# Patient Record
Sex: FEMALE | Race: WHITE | Employment: OTHER | URBAN - METROPOLITAN AREA
[De-identification: names, ages, dates, MRNs, and addresses within clinical notes are randomized per-mention and may not be internally consistent; named-entity substitution may affect disease eponyms.]

---

## 2016-07-13 LAB
CHLAMYDIA, EXTERNAL: NEGATIVE
HBSAG, EXTERNAL: NEGATIVE
HIV, EXTERNAL: NON REACTIVE
N. GONORRHEA, EXTERNAL: NEGATIVE
RUBELLA, EXTERNAL: NORMAL

## 2016-11-29 LAB — T. PALLIDUM, EXTERNAL: NEGATIVE

## 2017-01-26 LAB
GRBS, EXTERNAL: NEGATIVE
TYPE, ABO & RH, EXTERNAL: NORMAL

## 2017-02-20 ENCOUNTER — HOSPITAL ENCOUNTER (INPATIENT)
Age: 27
LOS: 2 days | Discharge: HOME | End: 2017-02-22
Attending: OBSTETRICS & GYNECOLOGY | Admitting: OBSTETRICS & GYNECOLOGY
Payer: COMMERCIAL

## 2017-02-20 PROBLEM — Z37.9 NORMAL LABOR: Status: ACTIVE | Noted: 2017-02-20

## 2017-02-20 LAB
BASOPHILS # BLD AUTO: 0 K/UL (ref 0–0.1)
BASOPHILS # BLD: 0 % (ref 0–1)
EOSINOPHIL # BLD: 0.1 K/UL (ref 0–0.4)
EOSINOPHIL NFR BLD: 1 % (ref 0–7)
ERYTHROCYTE [DISTWIDTH] IN BLOOD BY AUTOMATED COUNT: 14.7 % (ref 11.5–14.5)
HCT VFR BLD AUTO: 37.7 % (ref 35–47)
HGB BLD-MCNC: 12.5 G/DL (ref 11.5–16)
LYMPHOCYTES # BLD AUTO: 14 % (ref 12–49)
LYMPHOCYTES # BLD: 1.7 K/UL (ref 0.8–3.5)
MCH RBC QN AUTO: 29.9 PG (ref 26–34)
MCHC RBC AUTO-ENTMCNC: 33.2 G/DL (ref 30–36.5)
MCV RBC AUTO: 90.2 FL (ref 80–99)
MONOCYTES # BLD: 0.8 K/UL (ref 0–1)
MONOCYTES NFR BLD AUTO: 6 % (ref 5–13)
NEUTS SEG # BLD: 9.9 K/UL (ref 1.8–8)
NEUTS SEG NFR BLD AUTO: 79 % (ref 32–75)
PLATELET # BLD AUTO: 233 K/UL (ref 150–400)
RBC # BLD AUTO: 4.18 M/UL (ref 3.8–5.2)
WBC # BLD AUTO: 12.5 K/UL (ref 3.6–11)

## 2017-02-20 PROCEDURE — 74011250636 HC RX REV CODE- 250/636: Performed by: OBSTETRICS & GYNECOLOGY

## 2017-02-20 PROCEDURE — 77030031139 HC SUT VCRL2 J&J -A

## 2017-02-20 PROCEDURE — 74011250637 HC RX REV CODE- 250/637: Performed by: OBSTETRICS & GYNECOLOGY

## 2017-02-20 PROCEDURE — 0KQM0ZZ REPAIR PERINEUM MUSCLE, OPEN APPROACH: ICD-10-PCS | Performed by: OBSTETRICS & GYNECOLOGY

## 2017-02-20 PROCEDURE — 36415 COLL VENOUS BLD VENIPUNCTURE: CPT | Performed by: OBSTETRICS & GYNECOLOGY

## 2017-02-20 PROCEDURE — 65410000002 HC RM PRIVATE OB

## 2017-02-20 PROCEDURE — 75410000000 HC DELIVERY VAGINAL/SINGLE

## 2017-02-20 PROCEDURE — 75410000003 HC RECOV DEL/VAG/CSECN EA 0.5 HR

## 2017-02-20 PROCEDURE — 75410000002 HC LABOR FEE PER 1 HR

## 2017-02-20 PROCEDURE — 85025 COMPLETE CBC W/AUTO DIFF WBC: CPT | Performed by: OBSTETRICS & GYNECOLOGY

## 2017-02-20 PROCEDURE — 77030021125

## 2017-02-20 PROCEDURE — 74011250636 HC RX REV CODE- 250/636

## 2017-02-20 RX ORDER — KETOROLAC TROMETHAMINE 30 MG/ML
INJECTION, SOLUTION INTRAMUSCULAR; INTRAVENOUS
Status: COMPLETED
Start: 2017-02-20 | End: 2017-02-20

## 2017-02-20 RX ORDER — NALOXONE HYDROCHLORIDE 0.4 MG/ML
0.4 INJECTION, SOLUTION INTRAMUSCULAR; INTRAVENOUS; SUBCUTANEOUS AS NEEDED
Status: DISCONTINUED | OUTPATIENT
Start: 2017-02-20 | End: 2017-02-22 | Stop reason: HOSPADM

## 2017-02-20 RX ORDER — BISACODYL 5 MG
5 TABLET, DELAYED RELEASE (ENTERIC COATED) ORAL DAILY PRN
Status: DISCONTINUED | OUTPATIENT
Start: 2017-02-20 | End: 2017-02-22 | Stop reason: HOSPADM

## 2017-02-20 RX ORDER — ONDANSETRON 4 MG/1
4 TABLET, ORALLY DISINTEGRATING ORAL
Status: DISCONTINUED | OUTPATIENT
Start: 2017-02-20 | End: 2017-02-22 | Stop reason: HOSPADM

## 2017-02-20 RX ORDER — IBUPROFEN 400 MG/1
800 TABLET ORAL EVERY 8 HOURS
Status: DISCONTINUED | OUTPATIENT
Start: 2017-02-21 | End: 2017-02-22 | Stop reason: HOSPADM

## 2017-02-20 RX ORDER — ONDANSETRON 2 MG/ML
4 INJECTION INTRAMUSCULAR; INTRAVENOUS
Status: DISCONTINUED | OUTPATIENT
Start: 2017-02-20 | End: 2017-02-20 | Stop reason: HOSPADM

## 2017-02-20 RX ORDER — OXYCODONE AND ACETAMINOPHEN 5; 325 MG/1; MG/1
1 TABLET ORAL
Status: DISCONTINUED | OUTPATIENT
Start: 2017-02-20 | End: 2017-02-22 | Stop reason: HOSPADM

## 2017-02-20 RX ORDER — SODIUM CHLORIDE 0.9 % (FLUSH) 0.9 %
5-10 SYRINGE (ML) INJECTION AS NEEDED
Status: DISCONTINUED | OUTPATIENT
Start: 2017-02-20 | End: 2017-02-22 | Stop reason: HOSPADM

## 2017-02-20 RX ORDER — DIPHENHYDRAMINE HCL 25 MG
25 CAPSULE ORAL
Status: DISCONTINUED | OUTPATIENT
Start: 2017-02-20 | End: 2017-02-22 | Stop reason: HOSPADM

## 2017-02-20 RX ORDER — OXYTOCIN/RINGER'S LACTATE 20/1000 ML
125-500 PLASTIC BAG, INJECTION (ML) INTRAVENOUS ONCE
Status: COMPLETED | OUTPATIENT
Start: 2017-02-20 | End: 2017-02-20

## 2017-02-20 RX ORDER — SODIUM CHLORIDE 0.9 % (FLUSH) 0.9 %
5-10 SYRINGE (ML) INJECTION EVERY 8 HOURS
Status: DISCONTINUED | OUTPATIENT
Start: 2017-02-20 | End: 2017-02-22 | Stop reason: HOSPADM

## 2017-02-20 RX ORDER — KETOROLAC TROMETHAMINE 30 MG/ML
30 INJECTION, SOLUTION INTRAMUSCULAR; INTRAVENOUS
Status: COMPLETED | OUTPATIENT
Start: 2017-02-20 | End: 2017-02-20

## 2017-02-20 RX ORDER — CALCIUM CARBONATE 200(500)MG
400 TABLET,CHEWABLE ORAL
Status: DISCONTINUED | OUTPATIENT
Start: 2017-02-20 | End: 2017-02-20 | Stop reason: HOSPADM

## 2017-02-20 RX ORDER — SIMETHICONE 80 MG
80 TABLET,CHEWABLE ORAL
Status: DISCONTINUED | OUTPATIENT
Start: 2017-02-20 | End: 2017-02-22 | Stop reason: HOSPADM

## 2017-02-20 RX ORDER — SODIUM CHLORIDE, SODIUM LACTATE, POTASSIUM CHLORIDE, CALCIUM CHLORIDE 600; 310; 30; 20 MG/100ML; MG/100ML; MG/100ML; MG/100ML
125 INJECTION, SOLUTION INTRAVENOUS CONTINUOUS
Status: DISCONTINUED | OUTPATIENT
Start: 2017-02-20 | End: 2017-02-22 | Stop reason: HOSPADM

## 2017-02-20 RX ORDER — IBUPROFEN 400 MG/1
800 TABLET ORAL EVERY 8 HOURS
Status: DISCONTINUED | OUTPATIENT
Start: 2017-02-20 | End: 2017-02-20

## 2017-02-20 RX ORDER — HYDROCORTISONE ACETATE PRAMOXINE HCL 2.5; 1 G/100G; G/100G
CREAM TOPICAL AS NEEDED
Status: DISCONTINUED | OUTPATIENT
Start: 2017-02-20 | End: 2017-02-22 | Stop reason: HOSPADM

## 2017-02-20 RX ORDER — ACETAMINOPHEN 325 MG/1
650 TABLET ORAL
Status: DISCONTINUED | OUTPATIENT
Start: 2017-02-20 | End: 2017-02-22 | Stop reason: HOSPADM

## 2017-02-20 RX ORDER — NALOXONE HYDROCHLORIDE 0.4 MG/ML
0.4 INJECTION, SOLUTION INTRAMUSCULAR; INTRAVENOUS; SUBCUTANEOUS AS NEEDED
Status: DISCONTINUED | OUTPATIENT
Start: 2017-02-20 | End: 2017-02-20 | Stop reason: HOSPADM

## 2017-02-20 RX ORDER — OXYTOCIN/RINGER'S LACTATE 20/1000 ML
PLASTIC BAG, INJECTION (ML) INTRAVENOUS
Status: COMPLETED
Start: 2017-02-20 | End: 2017-02-20

## 2017-02-20 RX ORDER — LIDOCAINE HYDROCHLORIDE 10 MG/ML
INJECTION, SOLUTION EPIDURAL; INFILTRATION; INTRACAUDAL; PERINEURAL
Status: DISPENSED
Start: 2017-02-20 | End: 2017-02-21

## 2017-02-20 RX ADMIN — IBUPROFEN 800 MG: 400 TABLET, FILM COATED ORAL at 20:12

## 2017-02-20 RX ADMIN — Medication 19980 MILLI-UNITS/HR: at 16:02

## 2017-02-20 RX ADMIN — SODIUM CHLORIDE, SODIUM LACTATE, POTASSIUM CHLORIDE, AND CALCIUM CHLORIDE 999 ML/HR: 600; 310; 30; 20 INJECTION, SOLUTION INTRAVENOUS at 13:45

## 2017-02-20 RX ADMIN — Medication 20000 MILLI-UNITS: at 13:57

## 2017-02-20 RX ADMIN — KETOROLAC TROMETHAMINE 30 MG: 30 INJECTION, SOLUTION INTRAMUSCULAR at 14:10

## 2017-02-20 RX ADMIN — SODIUM CHLORIDE, SODIUM LACTATE, POTASSIUM CHLORIDE, AND CALCIUM CHLORIDE 999 ML/HR: 600; 310; 30; 20 INJECTION, SOLUTION INTRAVENOUS at 12:45

## 2017-02-20 RX ADMIN — KETOROLAC TROMETHAMINE 30 MG: 30 INJECTION, SOLUTION INTRAMUSCULAR; INTRAVENOUS at 14:10

## 2017-02-20 NOTE — L&D DELIVERY NOTE
Delivery Summary  Patient: Malika Cherry             Circumcision:   desires  Additional Delivery Comments - Pt presented to L&D in active labor at 6+ cm. Reassuring fetal monitoring - category 1. SROM - meconium with immediate strong urge to push.  over an intact perineum. Rapid second stage of labor - merely 1-2 pushes. Infant delivered in the BARRY position and restituted to LOT. A loose nuchal cord was easily reduced. The shoulders delivered spontaneously without any evidence of shoulder dystocia. The rest of the ensuing body delivered with ease. The mouth was suctioned while the cord was allowed to continue pulsating. The infant was placed on the mother's abdomen and the cord was then clamped and cut. An intact placenta with a 3-vessel cord and meconium-stained membranes delivered spontaneously. The cervix, vagina and rectum were examined. A large second degree perineal laceration and a right periurethral laceration were identified. 1% lidocaine was injected locally. The external anal sphincter was noted to be intact and was reinforced with a figure-of-eight of 2-0 vicryl. The second degree perineal laceration was repaired in the usual fashion with 2-0 and 3-0 vicryl. The right periurethral laceration was re-approximated with 4-0 vicryl. Excellent hemostasis was achieved. The rectum was noted to be intact. Mother and baby were stable at the end of the delivery.      Male - [place] Radha, 9lb 2oz, Apgars 9/9      Information for the patient's :  Napolean Daily [[id number]]       Labor Events:    Labor: No   Rupture Date: 2017   Rupture Time: 1:46 PM   Rupture Type SROM   Amniotic Fluid Volume: Large    Amniotic Fluid Description: Meconium None   Induction: None       Augmentation: None   Labor Events: None     Cervical Ripening:     None     Delivery Events:  Episiotomy: None   Laceration(s): Second degree perineal;Right periurethral     Repaired: Yes    Number of Repair Packets: 3 Suture Type and Size: Vicryl 2-0  Vicryl 3-0  Other Viryl 4-0   Estimated Blood Loss (ml): 450ml       Delivery Date: 2017    Delivery Time: 1:51 PM  Delivery Type: Vaginal, Spontaneous Delivery  Sex:  Male     Gestational Age: 44w3d   Delivery Clinician:  Madison Simpson  Living Status: Yes   Delivery Location: L&D            APGARS  One minute Five minutes Ten minutes   Skin color: 1   1        Heart rate: 2   2        Grimace: 2   2        Muscle tone: 2   2        Breathin   2        Totals: 9   9            Presentation: Vertex    Position: Left Occiput Anterior  Resuscitation Method:  Suctioning-bulb; Tactile Stimulation     Meconium Stained: Thin      Cord Vessels: 3 Vessels      Cord Events:    Cord Blood Sent?:  No    Blood Gases Sent?:  No    Placenta:  Date/Time:   1:57 PM  Removal: Spontaneous      Appearance: Intact     [place] Measurements:  Birth Weight: 4.16 kg      Birth Length: 53.3 cm      Head Circumference: 36.8 cm      Chest Circumference: 35.6 cm     Abdominal Girth: 34.9 cm    Other Providers:   Brooke LOCKE;JEMMA SCOTT;SHAHID CALLE;MIRIAM RYAN;SARAH ROJAS;SOFIA CARRERO, Obstetrician;Primary Nurse;Primary [place] Nurse; Obstetrician;Staff Nurse;Tech           Cord pH:  none    Episiotomy: None   Laceration(s): Second degree perineal;Right periurethral      Estimated Blood Loss (ml): 450    Labor Events  Method: None       Augmentation: None   Cervical Ripening:       None        Operative Vaginal Delivery - none    Group B Strep:   Lab Results   Component Value Date/Time    GrBStrep, External negative 2017     Information for the patient's :  Teddy Dillon [[id number]]   No results found for: ABORH, PCTABR, PCTDIG, BILI, ABORHEXT, ABORH    No results found for: APH, APCO2, APO2, AHCO3, ABEC, ABDC, O2ST, EPHV, PCO2V, PO2V, HCO3V, EBEV, EBDV, SITE, RSCOM

## 2017-02-20 NOTE — LACTATION NOTE
Discussed with mother her plan for feeding. Reviewed the benefits of exclusive breast milk feeding during the hospital stay. Informed mother of the risks of using formula to supplement in the first few days of life as well as the benefits of successful breast milk feeding; referred mother to the handout in her admission packet related to these topics. Mother acknowledges understanding of information reviewed and states that it is her plan to breast milk feed exclusively  her infant. Will support her choice and offer additional information as needed.

## 2017-02-20 NOTE — LACTATION NOTE
This note was copied from a baby's chart.  at one hour of life. Infant is latched in cross cradle hold, repeated suckle/pause cycles noted. Experienced and confident mother, asked if latch looked all right. Assisted to flange upper lip to assure deeper contact. Mother is fair skin/red hair with stated sensitive skin. Managed through sore nipples with 1st without concern. She has just weaned her 2 yr old 11 weeks ago. Over supply and donated milk to adoptive parents through a private network. Reviewed daily I/0 expectations and initiated bedside log. Expect success. Call prn. 3 [facility] # provided.

## 2017-02-20 NOTE — PROGRESS NOTES
1230- pt arrived from office with c/o labor- sve done in office by dr. Sharon Berumen and pt was 6-7cm. , pt reports no complications with this pregnancy. Pt reports starting labor at about 0630 with contractions increasing in intensity and she  denies rupture of membranes. Pt to bathroom to gown. Breathing through contractions. Pt sts she may want an epidural but if her labor is fast she would prefer to go without. Consents reviewed and witnessed. 1235- dr. Davy Lazaro in. Strip reviewed and poc discussed    1300- dr. Davy Lazaro reviewed strip. May take pt off monitor to ambulate    1335- pt requesting to be checked- sve done by alexy, pt requesting arom    1337-dr. karu in. Strip reviewed. poc discussed and  pt now states wants epidural and to wait on arom    1346- srom- mec fluid noted, pt sts she has to push, dr. Davy Lazaro called to bedside    1349- dr. La [place] in.     1351- head delivered, nuchal reduced x1- loose, shoulders and body delivered without difficulty.  Dr. Davy Lazaro in at [phone]- sbar report to celia,jeffc

## 2017-02-20 NOTE — PROGRESS NOTES
1500 - Bedside and Verbal shift change report given to Darylene Echevaria, RN (oncoming nurse) by JACKSON Nava RN (offgoing nurse). Report included the following information SBAR, Procedure Summary, Intake/Output, MAR and Recent Results     1600 - Discussed heavier but normal bleeding with Dr. Lacie Starkey. Plan to increase pitocin to 999ml/hr to finish bag. Will continue to monitor. 1622 - TRANSFER - OUT REPORT:    Verbal report given to Ehsan Adams RN (name) on Chandan Mcneil  being transferred to MIU (unit) for routine progression of care       Report consisted of patients Situation, Background, Assessment and   Recommendations(SBAR). Information from the following report(s) SBAR, Procedure Summary, Intake/Output, MAR and Recent Results was reviewed with the receiving nurse. Lines:   Peripheral IV 02/20/17 Left Hand (Active)   Site Assessment Clean, dry, & intact 2/20/2017  3:14 PM   Phlebitis Assessment 0 2/20/2017  3:14 PM   Infiltration Assessment 0 2/20/2017  3:14 PM   Dressing Status Clean, dry, & intact 2/20/2017  3:14 PM   Dressing Type Tape;Transparent 2/20/2017  3:14 PM   Hub Color/Line Status Pink; Infusing 2/20/2017  3:14 PM   Action Taken Blood drawn 2/20/2017 12:54 PM        Opportunity for questions and clarification was provided.       Patient transported with:

## 2017-02-20 NOTE — IP AVS SNAPSHOT
Current Discharge Medication List  
  
Take these medications at their scheduled times Dose & Instructions Dispensing Information Comments Morning Noon Evening Bedtime  
 cholecalciferol 1,000 unit Cap Commonly known as:  VITAMIN D3 Your next dose is: Today, Tomorrow Other:  ____________ Take  by mouth daily. Refills:  0 FIBER SUPPLEMENT PO Your next dose is: Today, Tomorrow Other:  ____________ Take  by mouth two (2) times a day. Refills:  0  
     
   
   
   
  
 levothyroxine 137 mcg tablet Commonly known as:  SYNTHROID Your next dose is: Today, Tomorrow Other:  ____________ Dose:  137 mcg Take 137 mcg by mouth Daily (before breakfast). New higher dose replaces prior script on file Quantity:  90 Tab Refills:  3 Take these medications as needed Dose & Instructions Dispensing Information Comments Morning Noon Evening Bedtime  
 ibuprofen 600 mg tablet Commonly known as:  MOTRIN Your next dose is: Today, Tomorrow Other:  ____________ Dose:  600 mg Take 1 Tab by mouth every six (6) hours as needed for Pain for up to 360 days. Quantity:  30 Tab Refills:  0 Take these medications as directed Dose & Instructions Dispensing Information Comments Morning Noon Evening Bedtime PRENATAL PO Your next dose is: Today, Tomorrow Other:  ____________ Take  by mouth. Refills:  0 Where to Get Your Medications Information about where to get these medications is not yet available ! Ask your nurse or doctor about these medications  
  ibuprofen 600 mg tablet

## 2017-02-20 NOTE — H&P
EDC:02/17/2017  EGA: 40 weeks, 3 days      Vital Signs   32Years Old Female  Weight: 172.9 pounds  BP:       106/66    Pap/HPV/Gardasil History   History of abnormal pap: no  Gardasil Injection History: Complete              Allergies      Medications Removed from Medication List        Flowsheet View for Follow-up Visit     Estimated weeks of        gestation:  36 3/7     Weight:  172.9     Blood pressure: 106 / 66     Urine Protein:  N     Urine Glucose: N     Headache:  No     Nausea/vomiting: No     Edema:  0     Vaginal bleeding: no     Vaginal discharge: d/c     Fetal activity:  yes     FHR:   144     Labor symptoms: yes     Fetal position:  vertex     Cx Dilation:  6-7     Cx Effacement: 80%     Cx Station:  -1     Next visit:  1 wk     Preceptor:  kg     Comment:  Reports contractions every 2 minutes, lasting for 30 seconds, had some discharge/\"gunk\" with pink tinge. No VB or LOF. Active FM        Impression & Recommendations:    Problem # 1:  Labor term active (ICD-650) ([id number])  admit to L&D in active labor  cbc, STBB  Orders:  Antepartum Care (CPT-10)        Medications (at conclusion of this visit)    04/22/2016 BRAINSTRONG PRENATAL 33-0.8 & 350 MG ORAL MISC (PRENATAL MV-MIN-FE CBN-FA-DHA) Prescribed by non 606/706 Juanjo Mcwilliams MD  04/22/2016 LEVO-T 137 MCG ORAL TABS (LEVOTHYROXINE SODIUM)           Visit Type:  Prenatal  Primary Provider:  Virgie Chou MD    CC:  Uterine Contractions. History of Present Illness:  Reports contractions every 2 minutes, lasting for 30 seconds, had some discharge/\"gunk\" with pink tinge. No VB or LOF.  Active FM        Past Medical History:     Reviewed history from 07/14/2016 and no changes required:        Thyroid Cancer 18yo        CF neg 2014    Past Surgical History:     Reviewed history from 07/13/2016 and no changes required:        Thyroidectomy 18yo        Vaginal Delivery 2015    Family History Summary:      Reviewed history Last on 07/13/2016 and no changes required:02/20/2017      General Comments - FH:  Family history transferred to [address] [place] [address] And 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[employer], homemaker                  Vital Signs    Blood Pressure: 106 / 66    Weight:  172.9 pounds                    Dilation: : 6-7                  Effacement:  80%                  Station:  -1                  Presentation:  vertex    Allergies      Medications Removed from Medication List        Impression & Recommendations:    Problem # 1:  Labor term active (ICD-650) ([id number])  admit to L&D in active labor  cbc, STBB  Orders:  Antepartum Care (CPT-10)        Medications (at conclusion of this visit)    04/22/2016 BRAINSTRONG PRENATAL 33-0.8 & 350 MG ORAL MISC (PRENATAL MV-MIN-FE CBN-FA-DHA) Prescribed by non [facility] MD  04/22/2016 LEVO-T 137 MCG ORAL TABS (LEVOTHYROXINE SODIUM)           LABORATORY DATA   TEST DATE RESULT   Group B Strep culture 01/26/2017 Negative                                   (Group B Strep Culture Result Field)   Blood Type 07/13/2016 O                                             (Blood Type Result Field)   Rh 07/13/2016 Positive                                   (Rh Result Field)   Rhogam Inj Given     Tdap Vaccine Given 11/29/2016 Vacc.  [address]   Antibody Screen 11/29/2016 See Final Results   Rubella  [facility] Reference Ranges On or After 3/10/14                  <0.90              Non-immune      0.90 - 0.99     Equivocal      >0.99              Immune    [facility] Reference Ranges  Before 3/10/14           <5                 Non-immune             5 - 9               Equivocal            >9                 Immune  [facility] Reference Ranges       < Or = 0.90       Negative             0.91-1.09          Equivocal            > Or = 1.10       Positive   07/13/2016     9.48     TPA (T Pallidum Antibodies) 11/29/2016 Negative   Serology (RPR)     HBsAg 07/13/2016 Negative HIV 07/13/2016 Non Reactive   Hemoglobin 11/29/2016 10.2   Hematocrit 11/29/2016 30.9   Platelets 13/33/9305 204 X10E3/UL   TSH 11/29/2016 0.886   Urine Culture 08/10/2016 Negative   GC DNA Probe 07/13/2016 Negative   Chlamydia DNA 07/13/2016 Negative   PAP 04/22/2016 NIL   Flu Vaccine Given 09/08/2016 Vacc. [facility]   HGBA1C     HGB Electro     T4, Free 11/29/2016 1.24   BG Fasting     GTT 1H 50G 11/29/2016 86   GTT 1H 100G     GTT 2H 100G     GTT 3H 100G     Glucose Plasma     CF Accept or Decline 07/13/2016 Prev Neg   CF Screen Result     Nuchal Trans 07/13/2016 Declined   AFP Only     Tetra 07/13/2016 Declined   AFP Serum     CVS 07/13/2016 declined   AFP Amniotic     Amnio Karyo     FISH     GC Culture     Chlamydia Cult     Ureaplasma     Mycoplasma     WBC 07/13/2016 8.2 X10E3/UL   RBC 07/13/2016 4.08 X10E6/UL   MCV 07/13/2016 92   MCH 07/13/2016 30.1   MCHC RBC 07/13/2016 32.6     ULTRASOUND DATA   TEST DATE RESULT   Estimated Fetal Weight 10/12/2016 [phone]                                     Weight % 10/12/2016 80th%%                                                JIMMY                      BPP     Cervical Length (mm)       ]      Electronically signed by Isatu Strauss MD on 02/20/2017 at 12:04 PM    ________________________________________________________________________    Date of Surgery Update:  Yoko Del Angel was seen and examined. Ctx began around 0100 and became regular at 0630. No LOF, some bloody show. Good fetal movement. Pregnancy complicated by anemia (on iron) and h/o thyroid CA (thyroidectomy, now on synthroid). History and physical has been reviewed. The patient has been examined.  There have been no significant clinical changes since the completion of the originally dated History and Physical.    Signed By: Lalito Pizano MD     February 20, 2017 12:42 PM

## 2017-02-20 NOTE — IP AVS SNAPSHOT
[address] [facility] 
[phone] Patient: Silke Brareto MRN: [id number] EDJ:[birth year] You are allergic to the following No active allergies Recent Documentation Height  
  
  
  
  
  
 1.626 m Unresulted Labs Order Current Status SAMPLE TO BLOOD BANK In process Emergency Contacts Name Discharge Info Relation Home Work Mobile Willy Davison DISCHARGE CAREGIVER [3] Spouse [3] [phone] About your hospitalization You were admitted on:  February 20, 2017 You last received care in the:  [facility] You were discharged on:  February 22, 2017 Unit phone number:  [phone] Why you were hospitalized Your primary diagnosis was:  Not on File Your diagnoses also included:  Normal Labor Providers Seen During Your Hospitalizations Provider Role Specialty Primary office phone Wilfredo Lowry MD Attending Provider Obstetrics & Gynecology [phone] Kerry Richardson MD Attending Provider Obstetrics & Gynecology [phone] Your Primary Care Physician (PCP) Primary Care Physician Office Phone Office Fax NONE ** None ** ** None ** Follow-up Information Follow up With Details Comments Contact Info None   None (395) Patient stated that they have no PCP Kerry Richardson MD In 6 weeks Follow up with Dr. Stan Mar in 6 weeks [address] [id number] [phone] Your Appointments Friday March 31, 2017  8:30 AM EDT Follow Up with [facility] [address] [address] [phone] Current Discharge Medication List  
  
START taking these medications Dose & Instructions Dispensing Information Comments Morning Noon Evening Bedtime  
 ibuprofen 600 mg tablet Commonly known as:  MOTRIN Your next dose is: Today, Tomorrow Other:  _________ Dose:  600 mg Take 1 Tab by mouth every six (6) hours as needed for Pain for up to 360 days. Quantity:  30 Tab Refills:  0 CONTINUE these medications which have NOT CHANGED Dose & Instructions Dispensing Information Comments Morning Noon Evening Bedtime  
 cholecalciferol 1,000 unit Cap Commonly known as:  VITAMIN D3 Your next dose is: Today, Tomorrow Other:  _________ Take  by mouth daily. Refills:  0 FIBER SUPPLEMENT PO Your next dose is: Today, Tomorrow Other:  _________ Take  by mouth two (2) times a day. Refills:  0  
     
   
   
   
  
 levothyroxine 137 mcg tablet Commonly known as:  SYNTHROID Your next dose is: Today, Tomorrow Other:  _________ Dose:  137 mcg Take 137 mcg by mouth Daily (before breakfast). New higher dose replaces prior script on file Quantity:  90 Tab Refills:  3 PRENATAL PO Your next dose is: Today, Tomorrow Other:  _________ Take  by mouth. Refills:  0 Where to Get Your Medications Information on where to get these meds will be given to you by the nurse or doctor. ! Ask your nurse or doctor about these medications  
  ibuprofen 600 mg tablet Discharge Instructions POST DELIVERY DISCHARGE INSTRUCTIONS Name: Dillan Raza YOB: 1990 Primary Diagnosis: Active Problems: * No active hospital problems. * General:  
 
Diet/Diet Restrictions: 
· Eight 8-ounce glasses of fluid daily (water, juices); avoid excessive caffeine intake. · Meals/snacks as desired which are high in fiber and carbohydrates and low in fat and cholesterol. Medications:  
 
 
 
Physical Activity / Restrictions / Safety: · Avoid heavy lifting, no more that 8 lbs. For 2-3 weeks;  
· Limit use of stairs to 2 times daily for the first week home. · No driving for one week. · Avoid intercourse 4-6 weeks, no douching or tampon use. · Check with obstetrician before starting or resuming an exercise program.   
 
Discharge Instructions/Special Treatment/Home Care Needs:  
 
· Continue prenatal vitamins. · Continue to use squirt bottle with warm water on your episiotomy after each bathroom use until bleeding stops. · If steri-strips applied to your incision, remove in 7-10 days. Call your doctor for the following: · Fever over 101 degrees by mouth. · Vaginal bleeding heavier than a normal menstrual period or clots larger than a golf ball. · Red streaks or increased swelling of legs, painful red streaks on your breast. 
· Painful urination, constipation and increased pain or swelling or discharge with your incision. · If you feel extremely anxious or overwhelmed. · If you have thoughts of harming yourself and/or your baby. Pain Management:  
 
· Take Acetaminophen (Tylenol) or Ibuprofen (Advil, Motrin), as directed for pain. · Use a warm Sitz bath 3 times daily to relieve episiotomy or hemorrhoidal discomfort. · For hemorrhoidal discomfort, use Tucks and Anusol cream as needed and directed. · Heating pad to  incision as needed. Follow-Up Care:  
 
Appointment with MD: Follow-up Appointments Procedures  FOLLOW UP VISIT Appointment in: 6 Weeks With Dr. Marcos Ford for postpartum check With Dr. Marcos Ford for postpartum check Standing Status:   Standing Number of Occurrences:   1 Order Specific Question:   Appointment in Answer:   6 Weeks Telephone number: [phone] Signed By: Jagdeep Solitario MD                                                                                                   Date: 2/22/2017 Time: 8:46 AM 
 
Discharge Orders None [facility] Announcement We are excited to announce that we are making your provider's discharge notes available to you in ULURU. You will see these notes when they are completed and signed by the physician that discharged you from your recent hospital stay. If you have any questions or concerns about any information you see in ULURU, please call the Health Information Department where you were seen or reach out to your Primary Care Provider for more information about your plan of care. Introducing [facility]! Dear Justin Carrillo: 
Thank you for requesting a ULURU account. Our records indicate that you already have an active ULURU account. You can access your account anytime at [email or web address] Did you know that you can access your hospital and ER discharge instructions at any time in ULURU? You can also review all of your test results from your hospital stay or ER visit. Additional Information If you have questions, please visit the Frequently Asked Questions section of the ULURU website at [email or web address] Remember, ULURU is NOT to be used for urgent needs. For medical emergencies, dial 911. Now available from your iPhone and Android! General Information Please provide this summary of care documentation to your next provider. Patient Signature:  ____________________________________________________________ Date:  ____________________________________________________________  
  
Jeffrey Vega Provider Signature:  ____________________________________________________________ Date:  ____________________________________________________________

## 2017-02-21 LAB
ERYTHROCYTE [DISTWIDTH] IN BLOOD BY AUTOMATED COUNT: 14.6 % (ref 11.5–14.5)
HCT VFR BLD AUTO: 28.2 % (ref 35–47)
HGB BLD-MCNC: 9.5 G/DL (ref 11.5–16)
MCH RBC QN AUTO: 30.2 PG (ref 26–34)
MCHC RBC AUTO-ENTMCNC: 33.7 G/DL (ref 30–36.5)
MCV RBC AUTO: 89.5 FL (ref 80–99)
PLATELET # BLD AUTO: 176 K/UL (ref 150–400)
RBC # BLD AUTO: 3.15 M/UL (ref 3.8–5.2)
WBC # BLD AUTO: 10.1 K/UL (ref 3.6–11)

## 2017-02-21 PROCEDURE — 65410000002 HC RM PRIVATE OB

## 2017-02-21 PROCEDURE — 74011250637 HC RX REV CODE- 250/637: Performed by: OBSTETRICS & GYNECOLOGY

## 2017-02-21 PROCEDURE — 36415 COLL VENOUS BLD VENIPUNCTURE: CPT | Performed by: OBSTETRICS & GYNECOLOGY

## 2017-02-21 PROCEDURE — 85027 COMPLETE CBC AUTOMATED: CPT | Performed by: OBSTETRICS & GYNECOLOGY

## 2017-02-21 RX ADMIN — IBUPROFEN 800 MG: 400 TABLET, FILM COATED ORAL at 21:02

## 2017-02-21 RX ADMIN — LEVOTHYROXINE SODIUM 137 MCG: 112 TABLET ORAL at 04:05

## 2017-02-21 RX ADMIN — IBUPROFEN 800 MG: 400 TABLET, FILM COATED ORAL at 04:05

## 2017-02-21 RX ADMIN — IBUPROFEN 800 MG: 400 TABLET, FILM COATED ORAL at 11:38

## 2017-02-21 NOTE — ROUTINE PROCESS
Bedside shift change report given to ARMANDO Andres (oncoming nurse) by Javon Abrams (offgoing nurse). Report included the following information SBAR.

## 2017-02-21 NOTE — ROUTINE PROCESS
Bedside shift change report given to PAVAN Vasquez RN (oncoming nurse) by ARMANDO Naqvi RN (offgoing nurse). Report included the following information SBAR.

## 2017-02-21 NOTE — LACTATION NOTE
This note was copied from a baby's chart. Infant  11 times in 24 hours with 2 voids and 6 stools. Weight loss is 3.7%. LATCH scores are 9-9 and 9. Mom has lacation resource numbers for discharge. Mom states nursing is going very well, nipples are intact. Mom is resting between feds. Mom wanted feeding assessed. Infant on right side . Nipple was flattening at base. Mom got into deeper latch and nipple rounded. Left nipple is flatter and Mom is using shells per choice to jag, however that nipple is pink and tender. Recommmended to rest left nipple for next feed and just use shell for 10 minutes prior to feed to jag when she does put infant to left side. Also set up pump that she may use for eversion  in lieu of shells. Recommended shells with larger base for healing nipple.

## 2017-02-21 NOTE — PROGRESS NOTES
Post-Partum Day Number 1 Progress Note    Rahel Davison     Assessment: Doing well, post partum day 1    Plan:  1. Continue routine postpartum and perineal care as well as maternal education. 2. The risks and benefits of the circumcision  procedure and anesthesia including: bleeding, infection, variability of cosmetic results were discussed at length with the mother. She is aware that future repeat procedures may be necessary. She gives informed consent to proceed as noted and her questions are answered. Information for the patient's :  Kristie Alberto [[id number]]   Vaginal, Spontaneous Delivery   Patient doing well without significant complaint. Voiding without difficulty, normal lochia. Vitals:  Visit Vitals    BP 95/62 (BP 1 Location: Right arm)    Pulse 77    Temp 98.1 °F (36.7 °C)    Resp 16    Ht 5' 4\" (1.626 m)    Wt 78.5 kg (173 lb)    SpO2 98%    Breastfeeding Yes    BMI 29.7 kg/m2     Temp (24hrs), Av.3 °F (36.8 °C), Min:97.8 °F (36.6 °C), Max:98.7 °F (37.1 °C)        Exam:   Patient without distress. Abdomen soft, fundus firm, nontender                Perineum with normal lochia noted. Lower extremities are negative for swelling, cords or tenderness.     Labs:     Lab Results   Component Value Date/Time    WBC 10.1 2017 04:11 AM    WBC 12.5 2017 12:50 PM    HGB 9.5 2017 04:11 AM    HGB 12.5 2017 12:50 PM    HCT 28.2 2017 04:11 AM    HCT 37.7 2017 12:50 PM    PLATELET 458  04:11 AM    PLATELET 340  12:50 PM       Recent Results (from the past 24 hour(s))   CBC WITH AUTOMATED DIFF    Collection Time: 17 12:50 PM   Result Value Ref Range    WBC 12.5 (H) 3.6 - 11.0 K/uL    RBC 4.18 3.80 - 5.20 M/uL    HGB 12.5 11.5 - 16.0 g/dL    HCT 37.7 35.0 - 47.0 %    MCV 90.2 80.0 - 99.0 FL    MCH 29.9 26.0 - 34.0 PG    MCHC 33.2 30.0 - 36.5 g/dL    RDW 14.7 (H) 11.5 - 14.5 %    PLATELET 010 150 - 400 K/uL    NEUTROPHILS 79 (H) 32 - 75 %    LYMPHOCYTES 14 12 - 49 %    MONOCYTES 6 5 - 13 %    EOSINOPHILS 1 0 - 7 %    BASOPHILS 0 0 - 1 %    ABS. NEUTROPHILS 9.9 (H) 1.8 - 8.0 K/UL    ABS. LYMPHOCYTES 1.7 0.8 - 3.5 K/UL    ABS. MONOCYTES 0.8 0.0 - 1.0 K/UL    ABS. EOSINOPHILS 0.1 0.0 - 0.4 K/UL    ABS.  BASOPHILS 0.0 0.0 - 0.1 K/UL   CBC W/O DIFF    Collection Time: 02/21/17  4:11 AM   Result Value Ref Range    WBC 10.1 3.6 - 11.0 K/uL    RBC 3.15 (L) 3.80 - 5.20 M/uL    HGB 9.5 (L) 11.5 - 16.0 g/dL    HCT 28.2 (L) 35.0 - 47.0 %    MCV 89.5 80.0 - 99.0 FL    MCH 30.2 26.0 - 34.0 PG    MCHC 33.7 30.0 - 36.5 g/dL    RDW 14.6 (H) 11.5 - 14.5 %    PLATELET 288 052 - 220 K/uL

## 2017-02-22 VITALS
DIASTOLIC BLOOD PRESSURE: 62 MMHG | HEART RATE: 80 BPM | RESPIRATION RATE: 16 BRPM | WEIGHT: 173 LBS | SYSTOLIC BLOOD PRESSURE: 107 MMHG | BODY MASS INDEX: 29.53 KG/M2 | OXYGEN SATURATION: 98 % | TEMPERATURE: 97.9 F | HEIGHT: 64 IN

## 2017-02-22 PROBLEM — Z37.9 NORMAL LABOR: Status: RESOLVED | Noted: 2017-02-20 | Resolved: 2017-02-22

## 2017-02-22 PROCEDURE — 74011250637 HC RX REV CODE- 250/637: Performed by: OBSTETRICS & GYNECOLOGY

## 2017-02-22 RX ORDER — IBUPROFEN 600 MG/1
600 TABLET ORAL
Qty: 30 TAB | Refills: 0 | Status: SHIPPED | OUTPATIENT
Start: 2017-02-22 | End: 2017-03-31

## 2017-02-22 RX ADMIN — LEVOTHYROXINE SODIUM 137 MCG: 112 TABLET ORAL at 05:05

## 2017-02-22 RX ADMIN — IBUPROFEN 800 MG: 400 TABLET, FILM COATED ORAL at 05:05

## 2017-02-22 NOTE — DISCHARGE INSTRUCTIONS
POST DELIVERY DISCHARGE INSTRUCTIONS    Name: Mateusz Bacon  YOB: 1990  Primary Diagnosis: Active Problems:    * No active hospital problems. *      General:     Diet/Diet Restrictions:  · Eight 8-ounce glasses of fluid daily (water, juices); avoid excessive caffeine intake. · Meals/snacks as desired which are high in fiber and carbohydrates and low in fat and cholesterol. Medications:         Physical Activity / Restrictions / Safety:     · Avoid heavy lifting, no more that 8 lbs. For 2-3 weeks;   · Limit use of stairs to 2 times daily for the first week home. · No driving for one week. · Avoid intercourse 4-6 weeks, no douching or tampon use. · Check with obstetrician before starting or resuming an exercise program.      Discharge Instructions/Special Treatment/Home Care Needs:     · Continue prenatal vitamins. · Continue to use squirt bottle with warm water on your episiotomy after each bathroom use until bleeding stops. · If steri-strips applied to your incision, remove in 7-10 days. Call your doctor for the following:     · Fever over 101 degrees by mouth. · Vaginal bleeding heavier than a normal menstrual period or clots larger than a golf ball. · Red streaks or increased swelling of legs, painful red streaks on your breast.  · Painful urination, constipation and increased pain or swelling or discharge with your incision. · If you feel extremely anxious or overwhelmed. · If you have thoughts of harming yourself and/or your baby. Pain Management:     · Take Acetaminophen (Tylenol) or Ibuprofen (Advil, Motrin), as directed for pain. · Use a warm Sitz bath 3 times daily to relieve episiotomy or hemorrhoidal discomfort. · For hemorrhoidal discomfort, use Tucks and Anusol cream as needed and directed. · Heating pad to  incision as needed.      Follow-Up Care:     Appointment with MD:   Follow-up Appointments   Procedures    FOLLOW UP VISIT Appointment in: 6 Weeks With Dr. Dianne Rosario for postpartum check     With Dr. Dianne Rosario for postpartum check     Standing Status:   Standing     Number of Occurrences:   1     Order Specific Question:   Appointment in     Answer:   Darylene Close     Telephone number: [phone]    Signed By: Alex Pabon MD                                                                                                   Date: 2/22/2017 Time: 8:46 AM

## 2017-02-22 NOTE — PROGRESS NOTES
Post-Partum Day Number 2 Progress Note    Rahel Davison     Assessment: Doing well, post partum day 2    Plan:   1. Discharge home today  2. Follow up in office in 6 weeks with Allan Logan MD  3. Post partum activity advised, diet as tolerated  4. Discharge Medications: ibuprofen and medications prior to admission    Information for the patient's :  Nichelle Pace [[id number]]   Vaginal, Spontaneous Delivery   Patient doing well without significant complaint. Voiding without difficulty, normal lochia. Vitals:  Visit Vitals    /60 (BP 1 Location: Right arm, BP Patient Position: Sitting)    Pulse 61    Temp 98.1 °F (36.7 °C)    Resp 16    Ht 5' 4\" (1.626 m)    Wt 78.5 kg (173 lb)    SpO2 98%    Breastfeeding Yes    BMI 29.7 kg/m2     Temp (24hrs), Av.1 °F (36.7 °C), Min:98.1 °F (36.7 °C), Max:98.1 °F (36.7 °C)      Exam:         Patient without distress. Abdomen soft, fundus firm, nontender                 Lower extremities are negative for swelling, cords or tenderness. Labs:     Lab Results   Component Value Date/Time    WBC 10.1 2017 04:11 AM    WBC 12.5 2017 12:50 PM    HGB 9.5 2017 04:11 AM    HGB 12.5 2017 12:50 PM    HCT 28.2 2017 04:11 AM    HCT 37.7 2017 12:50 PM    PLATELET 959  04:11 AM    PLATELET 822  12:50 PM       No results found for this or any previous visit (from the past 24 hour(s)).

## 2017-02-22 NOTE — LACTATION NOTE
Couplet Interdisciplinary Rounds     MATERNAL    Daily Goal:     Influenza screening completed: YES   Tdap screening completed: YES   Rhogam Given:N/A  MMR Given:N/A    VTE Prophylaxis: Not indicated, per Provider order    EPDS:            Patient Name: Deisy Reyes Diagnosis: labor  Normal labor   Date of Admission: 2017 LOS: 2  Gestational Age: Gestational Age: <None>       Daily Goal:     Birth Weight: No birth weight on file.  Current Weight: Weight: 78.5 kg (173 lb)  % of Weight Change: Birth weight not on file    Feeding:  [place] Metabolic Screen: YES and Repeat    Hepatitis B:  YES and On MAR    Discharge Bili:  YES  Car Seat Trial, if needed:  N/A      Patient/Family Teaching Needs:     Days before discharge: Ready for discharge    In Attendance:  Nursing and Physician

## 2017-02-22 NOTE — ROUTINE PROCESS
Bedside and Verbal shift change report given to KENNEDY Patton (oncoming nurse) by Xavier Hancock RN (offgoing nurse). Report included the following information SBAR, Procedure Summary, Intake/Output and MAR.

## 2017-02-22 NOTE — DISCHARGE SUMMARY
Obstetrical Discharge Summary     Name: Malika Cherry MRN: [id number]  SSN: [id number]    YOB: 1990  Age: 32 y.o. Sex: female      Admit Date: 2017    Discharge Date: 2017     Admitting Physician: Heath Domínguez MD     Attending Physician:  Hayder Fonseca MD     Admission Diagnoses: labor  Normal labor    Discharge Diagnoses:   Information for the patient's :  Napolean Daily [[id number]]   Delivery of a 4.16 kg male infant via Vaginal, Spontaneous Delivery on 2017 at 1:51 PM  by . Apgars were 9 and 9. Additional Diagnoses:    Lab Results   Component Value Date/Time    Rubella, External immune 2016    GrBStrep, External negative 2017       Hospital Course: Normal hospital course following the delivery. Disposition at Discharge: Home or self care    Discharged Condition: Stable    Patient Instructions:   Current Discharge Medication List      START taking these medications    Details   ibuprofen (MOTRIN) 600 mg tablet Take 1 Tab by mouth every six (6) hours as needed for Pain for up to 360 days. Qty: 30 Tab, Refills: 0         CONTINUE these medications which have NOT CHANGED    Details   levothyroxine (SYNTHROID) 137 mcg tablet Take 137 mcg by mouth Daily (before breakfast). New higher dose replaces prior script on file  Qty: 90 Tab, Refills: 3      cholecalciferol (VITAMIN D3) 1,000 unit cap Take  by mouth daily. PNV95/FERROUS FUMARATE/FA (PRENATAL PO) Take  by mouth. PSYLLIUM SEED, WITH SUGAR, (FIBER SUPPLEMENT PO) Take  by mouth two (2) times a day. Reference my discharge instructions.     Follow-up Appointments   Procedures    FOLLOW UP VISIT Appointment in: 6 Weeks With Dr. Flory Lindsay for postpartum check     With Dr. Flory Lindsay for postpartum check     Standing Status:   Standing     Number of Occurrences:   1     Order Specific Question:   Appointment in     Answer:   6 Weeks        Signed By:  Rick Garcia Ines Cortez MD     February 22, 2017

## 2017-02-22 NOTE — ROUTINE PROCESS
Bedside shift change report given to JAMESON Galicia RN (oncoming nurse) by PAVAN Brush RN (offgoing nurse). Report included the following information SBAR, Procedure Summary, Intake/Output, MAR and Recent Results. Juan Garcia

## 2017-03-31 ENCOUNTER — OFFICE VISIT (OUTPATIENT)
Dept: ENDOCRINOLOGY | Age: 27
End: 2017-03-31

## 2017-03-31 VITALS
WEIGHT: 148.4 LBS | OXYGEN SATURATION: 98 % | BODY MASS INDEX: 25.33 KG/M2 | SYSTOLIC BLOOD PRESSURE: 86 MMHG | DIASTOLIC BLOOD PRESSURE: 52 MMHG | HEART RATE: 80 BPM | HEIGHT: 64 IN

## 2017-03-31 DIAGNOSIS — C73 THYROID CANCER (HCC): Primary | ICD-10-CM

## 2017-03-31 RX ORDER — LEVOTHYROXINE SODIUM 88 UG/1
TABLET ORAL
COMMUNITY
Start: 2016-04-28 | End: 2017-03-31

## 2017-03-31 RX ORDER — LEVOTHYROXINE SODIUM 137 UG/1
TABLET ORAL
Qty: 90 TAB | Refills: 3
Start: 2017-03-31 | End: 2017-04-06 | Stop reason: SDUPTHER

## 2017-03-31 RX ORDER — SWAB
SWAB, NON-MEDICATED MISCELLANEOUS
COMMUNITY
End: 2017-03-31

## 2017-03-31 NOTE — MR AVS SNAPSHOT
Visit Information Date & Time Provider Department Dept. Phone Encounter #  
 3/31/2017  8:30 AM Savannah Mckee, [facility] [phone] Follow-up Instructions Return in about 6 months (around 9/30/2017). Upcoming Health Maintenance Date Due  
 HPV AGE 9Y-34Y (1 of 3 - Female 3 Dose Series) 5/27/2001 Pneumococcal 19-64 Highest Risk (1 of 3 - PCV13) 5/27/2009 DTaP/Tdap/Td series (1 - Tdap) 5/27/2011 PAP AKA CERVICAL CYTOLOGY 5/27/2011 Allergies as of 3/31/2017  Review Complete On: 3/31/2017 By: Savannah Mckee MD  
 No Known Allergies Current Immunizations  Never Reviewed Name Date Influenza Vaccine 10/1/2016 Not reviewed this visit You Were Diagnosed With   
  
 Codes Comments [facility]    -  Primary ICD-10-CM: B85 ICD-9-CM: 815 Vitals BP Pulse Height(growth percentile) Weight(growth percentile) SpO2 BMI  
 (!) 86/52 (BP 1 Location: Right arm, BP Patient Position: Sitting) 80 5' 4\" (1.626 m) 148 lb 6.4 oz (67.3 kg) 98% 25.47 kg/m2 OB Status Smoking Status Recent pregnancy Never Smoker Vitals History BMI and BSA Data Body Mass Index Body Surface Area  
 25.47 kg/m 2 1.74 m 2 Preferred Pharmacy Pharmacy Name Phone [id number] [phone] Your Updated Medication List  
  
   
This list is accurate as of: 3/31/17  8:56 AM.  Always use your most recent med list.  
  
  
  
  
 FIBER SUPPLEMENT PO Take  by mouth two (2) times a day. levothyroxine 137 mcg tablet Commonly known as:  SYNTHROID Take 1 tab 6 days a week and skips Sat since 2/20/17. PRENATAL PO Take  by mouth daily. We Performed the Following T4, FREE P0831523 CPT(R)] T4, FREE E2295827 CPT(R)] THYROGLOBULIN REFLEX PROFILE [id number] CPT(R)] TSH 3RD GENERATION [52346 CPT(R)] TSH 3RD GENERATION [08401 CPT(R)] Follow-up Instructions Return in about 6 months (around 9/30/2017). Patient Instructions 1) Repeat your labs next week and I'll e-mail you the results. 2) I will plan on seeing you back in 6 months but if you feel you need to be seen sooner or have labs drawn sooner, please let me know. 3) Please go to your local [facility] 3-4 days before your next appointment to have your labs drawn. Introducing [facility] SERVICES! Dear Shane Tejada: 
Thank you for requesting a y prime account. Our records indicate that you already have an active y prime account. You can access your account anytime at [email or web address] Did you know that you can access your hospital and ER discharge instructions at any time in y prime? You can also review all of your test results from your hospital stay or ER visit. Additional Information If you have questions, please visit the Frequently Asked Questions section of the y prime website at [email or web address] Remember, y prime is NOT to be used for urgent needs. For medical emergencies, dial 911. Now available from your iPhone and Android! Please provide this summary of care documentation to your next provider. Your primary care clinician is listed as NONE. If you have any questions after today's visit, please call [phone].

## 2017-03-31 NOTE — PROGRESS NOTES
Chief Complaint   Patient presents with    Follow-up     9 month thyroid f/up    Other     no pcp, pharmacy confirm     History of Present Illness: Taryn Ravi is a 32 y.o. female here for follow up of thyroid. Weight up 19 lbs since last visit in 6/16 but down 25 lbs since delivery of her son, Elli Alarcon, who was born on 2/20/17 vaginally without complications. Remained on 137 mcg of levothyroxine daily until delivery and since then has been skipping Saturdays and hasn't missed any doses. No chest pain or palpitations or tremors or shortness of breath. Currently breast feeding. Bowels are regular. No heat or cold intolerance. Has had some hair loss. Has had some dry skin. No brittle nails. Still takes her pill around 5 am and pnv is several hours later. No dizziness with standing or n/v.  Due for post-partum f/u with Dr. Tonia Sunshine next week. Current Outpatient Prescriptions   Medication Sig    levothyroxine (SYNTHROID) 137 mcg tablet Take 1 tab 6 days a week and skips Sat since 2/20/17.  PSYLLIUM SEED, WITH SUGAR, (FIBER SUPPLEMENT PO) Take  by mouth two (2) times a day.  PNV95/FERROUS FUMARATE/FA (PRENATAL PO) Take  by mouth daily. No current facility-administered medications for this visit.       No Known Allergies     Review of Systems:  - Cardiovascular: no chest pain  - Neurological: no tremors  - Integumentary: skin is normal    Physical Examination:  Blood pressure (!) 86/52, pulse 80, height 5' 4\" (1.626 m), weight 148 lb 6.4 oz (67.3 kg), SpO2 98 %, currently breastfeeding.  - General: pleasant, no distress, good eye contact   - Neck: well healed inferior neck scar, no palpable thyroid tissue, masses or lymph nodes  - Cardiovascular: regular, normal rate, nl s1 and s2, no m/r/g   - Integumentary: skin is normal, no edema  - Neurological: reflexes 2+ at biceps, no tremors  - Psychiatric: normal mood and affect    Data Reviewed:   - none new for review    Assessment/Plan: 1. Thyroid cancer ([facility]): Was 16years old and had a physical and was found to have a 3 cm left lobe nodule. Had an ultrasound guided biopsy in June 2007 that showed findings highly suspicious for papillary thyroid cancer. Was referred to a surgeon at [facility] and had total thyroidectomy in July 2007. 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 TSH was 198 and TG 0.5 and with negative TG antibodies prior to CHEN. Received 100.3 mCi CHEN in 8/07 and post-treatment scan did not show any suspicious areas. Was started on levothyroxine 100 mcg daily. Had a Thyrogen stimulated whole body scan in 2008 that was negative along with undetectable TG level and a thyrogen stimulated TG level that was undetectable in 2009 but did not have WBS that year. Her ultrasound was negative in 2010, 2013 and 2015. Her TG levels have been undetectable but she did develop slightly positive TG antibodies after her pregnancy in 2015. Was under the care of [facility] until 2013 and then moved to [facility] and became pregnant in 2004 and delivered her daughter in 2/15. Her dose remained at 100 mcg daily for many years and then was 100 mcg 1 tab on Mon-Sat and 1.5 tabs on Sun throughout the pregnancy and then after delivery her dose was decreased to 88 mcg daily in 11/15 when her TSH was 0.02 and remained on this until we called her for this visit and told her to take 1 tab on Mon-Fri and 2 tabs on Sat-Sun and has doubled her dose the past 2 weekends. Last TSH was 0.65 and FT4 was 1.14 and TG was < 0.5 with TG ab of 0.5 (nl < 0.4) in 3/16. Had an ultrasound at [facility] in 3/16 that commented on increased size of at least 2 subcentimeter morphologically abnormal lymph nodes, which may be reactive or indicative of recurrent disease.   However, because her TG was undetectable, plan was to simply repeat labs and ultrasound in 6 months. Her TSH was 0.46 in 6/16 at her 1st visit with me and I changed her to 112 mcg daily. TSH up to 5.87 in 9/16 and TG < 0.1 and ultrasound showed no suspicious masses or nodes so I increased her to 137 mcg daily and TSH down to 0.89 in 10/16 and 0.88 in 12/16. She decreased to 1 tab 6 days a week on 2/20/17 when her son was born so will repeat labs next week to see if a dose change is needed. - cont levothyroxine 137 mcg 1 tab 6 days a week and none on Sat until labs are back  - check TSH and free T4 next week and prior to next visit  - check thyroglobulin reflex profile prior to next visit        Patient Instructions   1) Repeat your labs next week and I'll e-mail you the results. 2) I will plan on seeing you back in 6 months but if you feel you need to be seen sooner or have labs drawn sooner, please let me know. 3) Please go to your local [facility] 3-4 days before your next appointment to have your labs drawn. Follow-up Disposition:  Return in about 6 months (around 9/30/2017). Copy sent to:  Dr. Honey Lindsay    Lab follow up: 4/6/17  - TSH 0.055  - FT4 1.85    Sent her the following message through [employer]:  TSH is a thyroid test.  Your level is 0.005 which is low and below goal of 0.5-2.0 and your free T4 is high at 1.85. This test goes opposite of your thyroid dose and suggests your dose of levothyroxine is still too much. I will decrease your dose to 100 mcg daily and have sent a new prescription to your local pharmacy. As long as you are feeling well you will stay on this dose until your next visit but if you feel you need to have your labs checked sooner, just let me know.     Lab follow up: 8/10/17  Component      Latest Ref Rng & Units 8/8/2017 8/8/2017 8/8/2017 8/8/2017           4:09 PM  4:09 PM  4:09 PM  4:09 PM   T4, Free      0.82 - 1.77 ng/dL    1.43   TSH      0.450 - 4.500 uIU/mL   0.172 (L)    Thyroglobulin Ab      0.0 - 0.9 IU/mL  <1.0 Thyroglobulin by EVELIO      1.5 - 38.5 ng/mL <0.1 (L)        Sent her the following message through [employer]:  TSH is a thyroid test.  Your level is 0.172 which is still slightly low and below goal of 0.5-2.0. This test goes opposite of your thyroid dose and suggests your dose of levothyroxine is still slightly too much. I will decrease your dose to 1 tab on Mon-Sat and 1/2 tab on Sunday until you come back to see me next month and updated your med list but did not send a new prescription to your local pharmacy. I put another lab order directly into the system so you can go to [facility] 2-3 days prior to your next visit and have your labs repeated by asking to have them drawn under my name. Your thyroglobulin (thyroid cancer blood test) is still undetectable. I'll see you as scheduled in September.

## 2017-03-31 NOTE — PATIENT INSTRUCTIONS
1) Repeat your labs next week and I'll e-mail you the results. 2) I will plan on seeing you back in 6 months but if you feel you need to be seen sooner or have labs drawn sooner, please let me know. 3) Please go to your local [facility] 3-4 days before your next appointment to have your labs drawn.

## 2017-04-06 RX ORDER — LEVOTHYROXINE SODIUM 100 UG/1
TABLET ORAL
Qty: 90 TAB | Refills: 3 | Status: SHIPPED | OUTPATIENT
Start: 2017-04-06 | End: 2017-08-10 | Stop reason: SDUPTHER

## 2017-08-09 LAB
T4 FREE SERPL-MCNC: 1.43 NG/DL (ref 0.82–1.77)
THYROGLOB AB SERPL-ACNC: <1 IU/ML (ref 0–0.9)
THYROGLOB SERPL-MCNC: <0.1 NG/ML (ref 1.5–38.5)
TSH SERPL DL<=0.005 MIU/L-ACNC: 0.17 UIU/ML (ref 0.45–4.5)

## 2017-08-10 RX ORDER — LEVOTHYROXINE SODIUM 100 UG/1
TABLET ORAL
Qty: 90 TAB | Refills: 3
Start: 2017-08-10 | End: 2018-04-01 | Stop reason: SDUPTHER

## 2017-09-20 LAB
T4 FREE SERPL-MCNC: 1.36 NG/DL (ref 0.82–1.77)
TSH SERPL DL<=0.005 MIU/L-ACNC: 0.42 UIU/ML (ref 0.45–4.5)

## 2017-09-22 ENCOUNTER — OFFICE VISIT (OUTPATIENT)
Dept: ENDOCRINOLOGY | Age: 27
End: 2017-09-22

## 2017-09-22 VITALS
BODY MASS INDEX: 24.69 KG/M2 | WEIGHT: 144.6 LBS | DIASTOLIC BLOOD PRESSURE: 60 MMHG | SYSTOLIC BLOOD PRESSURE: 90 MMHG | HEART RATE: 72 BPM | HEIGHT: 64 IN

## 2017-09-22 DIAGNOSIS — C73 THYROID CANCER (HCC): Primary | ICD-10-CM

## 2017-09-22 NOTE — PROGRESS NOTES
Chief Complaint   Patient presents with    Thyroid Problem     np pcp and pharmacy confirmed     History of Present Illness: Jesus Baum is a 32 y.o. female here for follow up of thyroid. Weight down 4 lbs since last visit in 3/17. Her son, Angela Russell, is now 10 months old. Still breastfeeding at this time and will continue this for now. Since 8/17 has been taking 6.5 tabs/week and hasn't missed any doses. No chest pain or palpitations or shortness of breath. No tremors. No heat or cold intolerance. No anxiety. Bowels are regular. Still without periods. No dizziness. Current Outpatient Prescriptions   Medication Sig    levothyroxine (SYNTHROID) 100 mcg tablet Take 1 tab on Mon-Sat and 1/2 tab on Sun--Dose change 8/10/17--updated med list--did not send prescription to the pharmacy    PSYLLIUM SEED, WITH SUGAR, (FIBER SUPPLEMENT PO) Take  by mouth two (2) times a day.  PNV95/FERROUS FUMARATE/FA (PRENATAL PO) Take  by mouth daily. No current facility-administered medications for this visit. No Known Allergies     Review of Systems:  - Cardiovascular: no chest pain  - Neurological: no tremors  - Integumentary: skin is normal    Physical Examination:  Blood pressure 90/60, pulse 72, height 5' 4\" (1.626 m), weight 144 lb 9.6 oz (65.6 kg), currently breastfeeding.  - General: pleasant, no distress, good eye contact   - Neck: well healed inferior neck scar, no palpable thyroid tissue, masses or lymph nodes  - Cardiovascular: regular, normal rate, nl s1 and s2, no m/r/g   - Respiratory: clear to auscultation bilaterally   - Integumentary: skin is normal, no edema  - Neurological: reflexes 2+ at biceps, no tremors  - Psychiatric: normal mood and affect    Data Reviewed:   Component      Latest Ref Rng & Units 9/19/2017 9/19/2017           1:49 PM  1:49 PM   T4, Free      0.82 - 1.77 ng/dL  1.36   TSH      0.450 - 4.500 uIU/mL 0.417 (L)        Assessment/Plan:     1. Thyroid cancer [facility]):  Was 16 years old and had a physical and was found to have a 3 cm left lobe nodule. Had an ultrasound guided biopsy in June 2007 that showed findings highly suspicious for papillary thyroid cancer. Was referred to a surgeon at [facility] and had total thyroidectomy in July 2007. 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 TSH was 198 and TG 0.5 and with negative TG antibodies prior to CHEN. Received 100.3 mCi CHEN in 8/07 and post-treatment scan did not show any suspicious areas. Was started on levothyroxine 100 mcg daily. Had a Thyrogen stimulated whole body scan in 2008 that was negative along with undetectable TG level and a thyrogen stimulated TG level that was undetectable in 2009 but did not have WBS that year. Her ultrasound was negative in 2010, 2013 and 2015. Her TG levels have been undetectable but she did develop slightly positive TG antibodies after her pregnancy in 2015. Was under the care of [facility] until 2013 and then moved to [facility] and became pregnant in 2004 and delivered her daughter in 2/15. Her dose remained at 100 mcg daily for many years and then was 100 mcg 1 tab on Mon-Sat and 1.5 tabs on Sun throughout the pregnancy and then after delivery her dose was decreased to 88 mcg daily in 11/15 when her TSH was 0.02 and remained on this until we called her for this visit and told her to take 1 tab on Mon-Fri and 2 tabs on Sat-Sun and has doubled her dose the past 2 weekends. Last TSH was 0.65 and FT4 was 1.14 and TG was < 0.5 with TG ab of 0.5 (nl < 0.4) in 3/16. Had an ultrasound at [facility] in 3/16 that commented on increased size of at least 2 subcentimeter morphologically abnormal lymph nodes, which may be reactive or indicative of recurrent disease. However, because her TG was undetectable, plan was to simply repeat labs and ultrasound in 6 months.   Her TSH was 0.46 in 6/16 at her 1st visit with me and I changed her to 112 mcg daily. TSH up to 5.87 in 9/16 and TG < 0.1 and ultrasound showed no suspicious masses or nodes so I increased her to 137 mcg daily and TSH down to 0.89 in 10/16 and 0.88 in 12/16. She decreased to 1 tab 6 days a week on 2/20/17 when her son was born and repeat TSH in 4/17 was 0.055 and FT4 1.85 so decreased her dose to 100 mcg daily. TSH still 0.17 and TG < 0.1 in 8/17 so decreased to 6.5 tabs/week and TSH 0.417 in 9/17 and feels well. Goal TSH at this point is 0.5-1.0 so will keep her dose the same. - cont levothyroxine 100 mcg 1 tab on Mon-Sat and 1/2 tab on Sun  - check TSH and free T4 and thyroglobulin reflex profile prior to next visit        Patient Instructions   1) TSH is a thyroid test.  Your level is 0.417 which is slightly low and below goal of 0.5-2.0. This test goes opposite of your thyroid dose and suggests your dose of levothyroxine is likely adequate but could possibly be too much. Please watch out for any symptoms of hyperthyroidism that would suggest your dose is too much such as chest pain, heart racing, anxiety, tremors, trouble sleeping, feeling hot all the time, losing weight with out trying, hair loss, or diarrhea. If they occur, skip your dose completely on Sunday. Follow-up Disposition:  Return in about 1 year (around 9/22/2018).     Copy sent to:  Dr. Praveen Sharpe

## 2017-09-22 NOTE — MR AVS SNAPSHOT
Visit Information Date & Time Provider Department Dept. Phone Encounter #  
 9/22/2017  8:30 AM Jing Alejandro, [address] [place] [address] Diabetes and Endocrinology [phone] [id number] Follow-up Instructions Return in about 1 year (around 9/22/2018). Upcoming Health Maintenance Date Due Pneumococcal 19-64 Highest Risk (1 of 3 - PCV13) 5/27/2009 DTaP/Tdap/Td series (1 - Tdap) 5/27/2011 PAP AKA CERVICAL CYTOLOGY 5/27/2011 INFLUENZA AGE 9 TO ADULT 8/1/2017 Allergies as of 9/22/2017  Review Complete On: 9/22/2017 By: Jing Alejandro MD  
 No Known Allergies Current Immunizations  Never Reviewed Name Date Influenza Vaccine 10/1/2016 Not reviewed this visit You Were Diagnosed With   
  
 Codes Comments [facility]    -  Primary ICD-10-CM: A48 ICD-9-CM: 899 Vitals BP Pulse Height(growth percentile) Weight(growth percentile) BMI OB Status 90/60 72 5' 4\" (1.626 m) 144 lb 9.6 oz (65.6 kg) 24.82 kg/m2 Recent pregnancy Smoking Status Never Smoker BMI and BSA Data Body Mass Index Body Surface Area  
 24.82 kg/m 2 1.72 m 2 Preferred Pharmacy Pharmacy Name Phone [phone] [phone] Your Updated Medication List  
  
   
This list is accurate as of: 9/22/17  8:50 AM.  Always use your most recent med list.  
  
  
  
  
 FIBER SUPPLEMENT PO Take  by mouth two (2) times a day. levothyroxine 100 mcg tablet Commonly known as:  SYNTHROID Take 1 tab on Mon-Sat and 1/2 tab on Sun--Dose change 8/10/17--updated med list--did not send prescription to the pharmacy PRENATAL PO Take  by mouth daily. Follow-up Instructions Return in about 1 year (around 9/22/2018). Patient Instructions 1) TSH is a thyroid test.  Your level is 0.417 which is slightly low and below goal of 0.5-1.0. This test goes opposite of your thyroid dose and suggests your dose of levothyroxine is likely adequate but could possibly be too much. Please watch out for any symptoms of hyperthyroidism that would suggest your dose is too much such as chest pain, heart racing, anxiety, tremors, trouble sleeping, feeling hot all the time, losing weight with out trying, hair loss, or diarrhea. If they occur, skip your dose completely on Sunday. 2) I will send you a reminder e-mail 3-4 weeks prior to your next visit and you will have the order already in the [facility] system so you can just go sometime in the 3-7 days before the next visit to have your labs drawn. 3) I will plan on seeing you back in one year but if you feel you need to have your labs checked sooner, please let me know. Introducing [facility]! Dear Andres Corral: 
Thank you for requesting a [employer] account. Our records indicate that you already have an active [employer] account. You can access your account anytime at [email or web address] Did you know that you can access your hospital and ER discharge instructions at any time in [employer]? You can also review all of your test results from your hospital stay or ER visit. Additional Information If you have questions, please visit the Frequently Asked Questions section of the [employer] website at [email or web address] Remember, [employer] is NOT to be used for urgent needs. For medical emergencies, dial 911. Now available from your iPhone and Android! Please provide this summary of care documentation to your next provider. Your primary care clinician is listed as NONE. If you have any questions after today's visit, please call [phone].

## 2017-09-22 NOTE — PATIENT INSTRUCTIONS
1) TSH is a thyroid test.  Your level is 0.417 which is slightly low and below goal of 0.5-1.0. This test goes opposite of your thyroid dose and suggests your dose of levothyroxine is likely adequate but could possibly be too much. Please watch out for any symptoms of hyperthyroidism that would suggest your dose is too much such as chest pain, heart racing, anxiety, tremors, trouble sleeping, feeling hot all the time, losing weight with out trying, hair loss, or diarrhea. If they occur, skip your dose completely on Sunday. 2) I will send you a reminder e-mail 3-4 weeks prior to your next visit and you will have the order already in the [facility] system so you can just go sometime in the 3-7 days before the next visit to have your labs drawn. 3) I will plan on seeing you back in one year but if you feel you need to have your labs checked sooner, please let me know.

## 2018-04-01 RX ORDER — LEVOTHYROXINE SODIUM 100 UG/1
TABLET ORAL
Qty: 90 TAB | Refills: 3 | Status: SHIPPED | OUTPATIENT
Start: 2018-04-01 | End: 2018-09-12 | Stop reason: SDUPTHER

## 2018-09-03 DIAGNOSIS — C73 THYROID CANCER (HCC): ICD-10-CM

## 2018-09-11 LAB
T4 FREE SERPL-MCNC: 1.39 NG/DL (ref 0.82–1.77)
THYROGLOB AB SERPL-ACNC: <1 IU/ML (ref 0–0.9)
THYROGLOB SERPL-MCNC: <0.1 NG/ML (ref 1.5–38.5)
TSH SERPL DL<=0.005 MIU/L-ACNC: 2.69 UIU/ML (ref 0.45–4.5)

## 2018-09-12 RX ORDER — LEVOTHYROXINE SODIUM 100 UG/1
TABLET ORAL
Qty: 90 TAB | Refills: 3 | Status: SHIPPED | OUTPATIENT
Start: 2018-09-12 | End: 2018-09-21 | Stop reason: SDUPTHER

## 2018-09-21 ENCOUNTER — OFFICE VISIT (OUTPATIENT)
Dept: ENDOCRINOLOGY | Age: 28
End: 2018-09-21

## 2018-09-21 VITALS
SYSTOLIC BLOOD PRESSURE: 97 MMHG | HEIGHT: 64 IN | DIASTOLIC BLOOD PRESSURE: 55 MMHG | BODY MASS INDEX: 24.28 KG/M2 | WEIGHT: 142.2 LBS | HEART RATE: 65 BPM

## 2018-09-21 DIAGNOSIS — C73 THYROID CANCER (HCC): Primary | ICD-10-CM

## 2018-09-21 RX ORDER — NORETHINDRONE 0.35 MG/1
TABLET ORAL
Refills: 4 | COMMUNITY
Start: 2018-09-17 | End: 2019-10-01

## 2018-09-21 RX ORDER — LEVOTHYROXINE SODIUM 100 UG/1
TABLET ORAL
Qty: 90 TAB | Refills: 3 | Status: SHIPPED | OUTPATIENT
Start: 2018-09-21 | End: 2018-11-04 | Stop reason: DRUGHIGH

## 2018-09-21 NOTE — PATIENT INSTRUCTIONS
1) TSH is a thyroid test.  Your level is 2.6 which is normal but slightly above goal of 0.5-2.0. This test goes opposite of your thyroid dose and suggests your dose of levothyroxine is not quite enough after starting the birth control. I will increase your dose back to 1 tab 7 days a week and have sent a new prescription to your local pharmacy. 2) Your thyroglobulin (thyroid cancer blood test) remains undetectable so there is no evidence of cancer at this time. 3) We'll repeat your labs in 6 weeks and I'll e-mail you a reminder and the results. 4) We'll repeat your labs again prior to the next visit in one year but if you ever feel you need them checked sooner, let me know.

## 2018-09-21 NOTE — PROGRESS NOTES
Chief Complaint   Patient presents with    Thyroid Problem     pcp and pharmacy confirmed     History of Present Illness: Pearl Gottron is a 29 y.o. female here for follow up of thyroid. Weight down 2 lbs since last visit in 9/17. Her son, Tete Presley, is now 23 months and she is still breastfeeding. Started the [facility] in June. Did have 2 periods prior to starting the OCP and since starting this has not had a period. Still taking 6.5 tabs/week and hasn't missed any doses or run out. Bowels are regular. Has had more hair loss. No brittle nails or dry skin. No heat or cold intolerance. Overall energy is pretty normal.      Current Outpatient Prescriptions   Medication Sig    levothyroxine (SYNTHROID) 100 mcg tablet Take 1 tab on Mon-Sat and 1/2 tab on Sun    PSYLLIUM SEED, WITH SUGAR, (FIBER SUPPLEMENT PO) Take  by mouth two (2) times a day.  PNV95/FERROUS FUMARATE/FA (PRENATAL PO) Take  by mouth daily.  EMMA 0.35 mg tab TAKE ONE TABLET BY MOUTH THE SAME TIME EVERYDAY     No current facility-administered medications for this visit.       No Known Allergies     Review of Systems:  - Cardiovascular: no chest pain  - Neurological: no tremors  - Integumentary: skin is normal    Physical Examination:  Blood pressure 97/55, pulse 65, height 5' 4\" (1.626 m), weight 142 lb 3.2 oz (64.5 kg), currently breastfeeding.  - General: pleasant, no distress, good eye contact   - Neck: well healed inferior neck scar, no palpable thyroid tissue, masses or lymph nodes  - Cardiovascular: regular, normal rate, nl s1 and s2, no m/r/g   - Respiratory: clear to auscultation bilaterally   - Integumentary: skin is normal, no edema  - Neurological: reflexes 2+ at biceps, no tremors  - Psychiatric: normal mood and affect    Data Reviewed:   Component      Latest Ref Rng & Units 9/10/2018 9/10/2018 9/10/2018 9/10/2018          12:15 PM 12:15 PM 12:15 PM 12:15 PM   T4, Free      0.82 - 1.77 ng/dL    1.39   TSH      0.450 - 4.500 uIU/mL   2.690    Thyroglobulin Ab      0.0 - 0.9 IU/mL  <1.0     Thyroglobulin by EVELIO      1.5 - 38.5 ng/mL <0.1 (L)          Assessment/Plan:     1. Thyroid cancer [facility]): Was 16years old and had a physical and was found to have a 3 cm left lobe nodule. Had an ultrasound guided biopsy in June 2007 that showed findings highly suspicious for papillary thyroid cancer. Was referred to a surgeon at [facility] and had total thyroidectomy in July 2007. 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 TSH was 198 and TG 0.5 and with negative TG antibodies prior to CHEN. Received 100.3 mCi CHEN in 8/07 and post-treatment scan did not show any suspicious areas. Was started on levothyroxine 100 mcg daily. Had a Thyrogen stimulated whole body scan in 2008 that was negative along with undetectable TG level and a thyrogen stimulated TG level that was undetectable in 2009 but did not have WBS that year. Her ultrasound was negative in 2010, 2013 and 2015. Her TG levels have been undetectable but she did develop slightly positive TG antibodies after her pregnancy in 2015. Was under the care of [facility] until 2013 and then moved to [facility] and became pregnant in 2004 and delivered her daughter in 2/15. Her dose remained at 100 mcg daily for many years and then was 100 mcg 1 tab on Mon-Sat and 1.5 tabs on Sun throughout the pregnancy and then after delivery her dose was decreased to 88 mcg daily in 11/15 when her TSH was 0.02 and remained on this until we called her for this visit and told her to take 1 tab on Mon-Fri and 2 tabs on Sat-Sun and has doubled her dose the past 2 weekends. Last TSH was 0.65 and FT4 was 1.14 and TG was < 0.5 with TG ab of 0.5 (nl < 0.4) in 3/16.   Had an ultrasound at [facility] in 3/16 that commented on increased size of at least 2 subcentimeter morphologically abnormal lymph nodes, which may be reactive or indicative of recurrent disease. However, because her TG was undetectable, plan was to simply repeat labs and ultrasound in 6 months. Her TSH was 0.46 in 6/16 at her 1st visit with me and I changed her to 112 mcg daily. TSH up to 5.87 in 9/16 and TG < 0.1 and ultrasound showed no suspicious masses or nodes so I increased her to 137 mcg daily and TSH down to 0.89 in 10/16 and 0.88 in 12/16. She decreased to 1 tab 6 days a week on 2/20/17 when her son was born and repeat TSH in 4/17 was 0.055 and FT4 1.85 so decreased her dose to 100 mcg daily. TSH still 0.17 and TG < 0.1 in 8/17 so decreased to 6.5 tabs/week and TSH 0.417 in 9/17 and felt  Well so kept her dose the same. TSH up to 2.69 in 9/18 and TG < 0.1 after starting OCP in 6/18 so will increase back to 1 tab daily to get to goal TSH of 0.5-1.0.  - increase levothyroxine 100 mcg to 1 tab daily  - check TSH and free T4 in 6 weeks  - check TSH and free T4 and thyroglobulin reflex profile prior to next visit        Patient Instructions   1) TSH is a thyroid test.  Your level is 2.6 which is normal but slightly above goal of 0.5-2.0. This test goes opposite of your thyroid dose and suggests your dose of levothyroxine is not quite enough after starting the birth control. I will increase your dose back to 1 tab 7 days a week and have sent a new prescription to your local pharmacy. 2) Your thyroglobulin (thyroid cancer blood test) remains undetectable so there is no evidence of cancer at this time. 3) We'll repeat your labs in 6 weeks and I'll e-mail you a reminder and the results. 4) We'll repeat your labs again prior to the next visit in one year but if you ever feel you need them checked sooner, let me know. Follow-up Disposition:  Return in about 1 year (around 9/21/2019).     Copy sent to:  MD Dr. Geeta Juares    Lab follow up: 11/4/18  Component      Latest Ref Rng & Units 11/2/2018 11/2/2018           8:53 AM  8:53 AM   T4, Free      0.82 - 1.77 ng/dL  1.38   TSH      0.450 - 4.500 uIU/mL 4.190      Sent her the following message through [employer]:  TSH is a thyroid test.  Your level is 4.19 which is normal but above goal of 0.5-2.0. This test goes opposite of your thyroid dose and suggests your dose of levothyroxine is not enough or you have missed doses sometime in the past 6 weeks. Please let me know if you have missed any doses and I'll determine if need to change your does or not. Addendum: 11/4/18    We had the following e-mail exchange:    Hira Parker. Then let's go up to 112 mcg to take 1 tab 7 days a week. This will be ready for  at the pharmacy today. Feel free to use up the 100 mcg tabs by taking 1 tab 6 days a week and 2 tabs once a week on a day of your choice. Let's repeat another set of labs in 6 more weeks. I'll mail you another lab slip and send you a reminder e-mail to have this drawn.    ===View-only below this line===      ----- Message -----     From: Chely Marrufo     Sent: 11/4/2018  4:41 PM EST       To: Stormy Hernandez MD  Subject: RE:lab results    Hi Dr. Severino Cardenas,    I have not missed any doses. I take it daily at 4am.    Thank you for keeping an eye on it and I'm happy to change doses to see if that helps. Lab follow up: 12/23/18  Component      Latest Ref Rng & Units 12/20/2018 12/20/2018           9:31 AM  9:31 AM   T4, Free      0.82 - 1.77 ng/dL  1.83 (H)   TSH      0.450 - 4.500 uIU/mL 0.337 (L)      Sent her the following message through [employer]:  TSH is a thyroid test.  Your level is 0.337 which is now slightly low and below goal of 0.5-2.0 and your free T4 was slightly high at 1.83. This test goes opposite of your thyroid dose and suggests your dose of levothyroxine is now slightly more than you need.   Since 100 mcg was not quite enough and 112 mcg is slightly more than you need, I will have you use an in-between dose of 112 mcg 1 tab 6 days a week and 1/2 tab on Sunday (or a day of your choice). I updated your med list but did not send a new prescription to your pharmacy on file that has been filling this med. As long as you are feeling well, you should be fine to stay on this dose until you come back to see me but if you feel you need to have your labs repeated sooner, just let me know.

## 2018-09-21 NOTE — MR AVS SNAPSHOT
[address] [facility] [address] [address] [phone] Patient: Sebas Gama MRN: [id number] Visit Information Date & Time Provider Department Dept. Phone Encounter #  
 9/21/2018  8:30 AM Gogo Love, [address] [place] [address] Diabetes and Endocrinology [phone] [id number] Follow-up Instructions Return in about 1 year (around 9/21/2019). Upcoming Health Maintenance Date Due Pneumococcal 19-64 Highest Risk (1 of 3 - PCV13) 5/27/2009 DTaP/Tdap/Td series (1 - Tdap) 5/27/2011 PAP AKA CERVICAL CYTOLOGY 5/27/2011 Influenza Age 5 to Adult 8/1/2018 Allergies as of 9/21/2018  Review Complete On: 9/21/2018 By: Gogo Love MD  
 No Known Allergies Current Immunizations  Never Reviewed Name Date Influenza Vaccine 10/1/2016 Not reviewed this visit You Were Diagnosed With   
  
 Codes Comments [facility] ([facility]    -  Primary ICD-10-CM: P29 ICD-9-CM: 181 Vitals BP Pulse Height(growth percentile) Weight(growth percentile) BMI OB Status 97/55 65 5' 4\" (1.626 m) 142 lb 3.2 oz (64.5 kg) 24.41 kg/m2 Recent pregnancy Smoking Status Never Smoker Vitals History BMI and BSA Data Body Mass Index Body Surface Area  
 24.41 kg/m 2 1.71 m 2 Preferred Pharmacy Pharmacy Name Phone [phone] [phone] Your Updated Medication List  
  
   
This list is accurate as of 9/21/18  8:59 AM.  Always use your most recent med list.  
  
  
  
  
 EMMA 0.35 mg Tab Generic drug:  norethindrone TAKE ONE TABLET BY MOUTH THE SAME TIME EVERYDAY FIBER SUPPLEMENT PO Take  by mouth two (2) times a day. levothyroxine 100 mcg tablet Commonly known as:  SYNTHROID Take 1 tab daily PRENATAL PO Take  by mouth daily. Prescriptions Sent to Pharmacy Refills  
 levothyroxine (SYNTHROID) 100 mcg tablet 3 Sig: Take 1 tab daily Class: Normal  
 Pharmacy: [facility] [id number], [address] [place] [address] #: [phone] We Performed the Following T4, FREE [id number] CPT(R)] TSH 3RD GENERATION [45404 CPT(R)] Follow-up Instructions Return in about 1 year (around 9/21/2019). To-Do List   
 09/10/2019 Lab:  T4, FREE   
  
 09/10/2019 Lab:  THYROGLOBULIN REFLEX PROFILE   
  
 09/10/2019 Lab:  TSH 3RD GENERATION Patient Instructions 1) TSH is a thyroid test.  Your level is 2.6 which is normal but slightly above goal of 0.5-2.0. This test goes opposite of your thyroid dose and suggests your dose of levothyroxine is not quite enough after starting the birth control. I will increase your dose back to 1 tab 7 days a week and have sent a new prescription to your local pharmacy. 2) Your thyroglobulin (thyroid cancer blood test) remains undetectable so there is no evidence of cancer at this time. 3) We'll repeat your labs in 6 weeks and I'll e-mail you a reminder and the results. 4) We'll repeat your labs again prior to the next visit in one year but if you ever feel you need them checked sooner, let me know. Introducing [facility]! Dear Meg Lemus: 
Thank you for requesting a [employer] account. Our records indicate that you already have an active [employer] account. You can access your account anytime at [email or web address] Did you know that you can access your hospital and ER discharge instructions at any time in [employer]? You can also review all of your test results from your hospital stay or ER visit. Additional Information If you have questions, please visit the Frequently Asked Questions section of the [employer] website at [email or web address] Remember, MyChart is NOT to be used for urgent needs. For medical emergencies, dial 911. Now available from your iPhone and Android! Please provide this summary of care documentation to your next provider. Your primary care clinician is listed as Evon Garcia. If you have any questions after today's visit, please call [phone].

## 2018-11-03 LAB
T4 FREE SERPL-MCNC: 1.38 NG/DL (ref 0.82–1.77)
TSH SERPL DL<=0.005 MIU/L-ACNC: 4.19 UIU/ML (ref 0.45–4.5)

## 2018-11-04 RX ORDER — LEVOTHYROXINE SODIUM 112 UG/1
112 TABLET ORAL
Qty: 90 TAB | Refills: 3 | Status: SHIPPED | OUTPATIENT
Start: 2018-11-04 | End: 2018-12-23

## 2018-12-21 LAB
T4 FREE SERPL-MCNC: 1.83 NG/DL (ref 0.82–1.77)
TSH SERPL DL<=0.005 MIU/L-ACNC: 0.34 UIU/ML (ref 0.45–4.5)

## 2018-12-23 RX ORDER — LEVOTHYROXINE SODIUM 112 UG/1
TABLET ORAL
Qty: 90 TAB | Refills: 3
Start: 2018-12-23 | End: 2019-10-19 | Stop reason: SDUPTHER

## 2019-05-09 ENCOUNTER — TELEPHONE (OUTPATIENT)
Dept: ENDOCRINOLOGY | Age: 29
End: 2019-05-09

## 2019-05-09 DIAGNOSIS — C73 THYROID CANCER (HCC): Primary | ICD-10-CM

## 2019-05-09 NOTE — TELEPHONE ENCOUNTER
Sent her the following message through [employer]:    [facility].  I'll put the order in now. Please call the Patient Care team at [phone] to schedule this appointment at your earliest convenience. I'll be in touch with the results.

## 2019-05-09 NOTE — TELEPHONE ENCOUNTER
Regarding: RE: Non-Urgent Medical Question  Contact: [phone]  ----- Message from Red valente, Generic sent at 5/9/2019  7:44 PM EDT -----    I'd like to go ahead and do the neck ultrasound just to be 100% sure; if that comes back all normal I'll plan to call the ENT physician. Thank you so much for your quick response and I truly appreciate the okay on the ultrasound. I'm sure you're correct and it's not thyroid related but with two small kids at home I want to rule that out definitively. Thank you again,   Moni Stevenson  ----- Message -----  From: Stephen Altaimrano MD  Sent: 5/9/2019  6:43 PM EDT  To: Chika Moran  Subject: RE: Non-Urgent Medical Question  I highly doubt this is related to your thyroid but if you would like me to an order a neck ultrasound, I'm happy to do so if that would put your mind at ease. Otherwise, it may be worth seeing an ENT for an evaluation. I would plan on calling Dr. Pilar Hunter at [facility] at [phone] to further evaluate your symptoms. Let me know what you would like to do.      ----- Message -----     From: Chika Moran     Sent: 5/9/2019  6:15 PM EDT       To: Stephen Altamirano MD  Subject: Non-Urgent Denisse Gonzalez,    I have had a hoarse voice for about a month now. At first I just thought it was due to the pollen but as the days go on and the hoarseness is getting worse I'm getting a bit concerned it could be thyroid cancer/lymph node related. Other than the hoarseness I feel completely fine. Should I maybe come see you to make sure all is well? It has almost been three years since my last neck ultrasound too--maybe I should do that to put my anxiety at ease even though my Tg was undectable when I saw you last September.     Thank you for your advice,    Moni Stevenson

## 2019-05-24 ENCOUNTER — HOSPITAL ENCOUNTER (OUTPATIENT)
Dept: ULTRASOUND IMAGING | Age: 29
Discharge: HOME | End: 2019-05-24
Attending: INTERNAL MEDICINE
Payer: COMMERCIAL

## 2019-05-24 DIAGNOSIS — C73 THYROID CANCER (HCC): ICD-10-CM

## 2019-05-24 PROCEDURE — 76536 US EXAM OF HEAD AND NECK: CPT

## 2019-09-10 DIAGNOSIS — C73 THYROID CANCER (HCC): ICD-10-CM

## 2019-09-22 LAB
T4 FREE SERPL-MCNC: 1.55 NG/DL (ref 0.82–1.77)
THYROGLOB AB SERPL-ACNC: <1 IU/ML (ref 0–0.9)
THYROGLOB SERPL-MCNC: <0.1 NG/ML (ref 1.5–38.5)
TSH SERPL DL<=0.005 MIU/L-ACNC: 1 UIU/ML (ref 0.45–4.5)

## 2019-10-01 ENCOUNTER — OFFICE VISIT (OUTPATIENT)
Dept: ENDOCRINOLOGY | Age: 29
End: 2019-10-01

## 2019-10-01 VITALS
HEART RATE: 79 BPM | BODY MASS INDEX: 24.96 KG/M2 | DIASTOLIC BLOOD PRESSURE: 51 MMHG | WEIGHT: 146.2 LBS | RESPIRATION RATE: 16 BRPM | OXYGEN SATURATION: 97 % | SYSTOLIC BLOOD PRESSURE: 85 MMHG | HEIGHT: 64 IN

## 2019-10-01 DIAGNOSIS — C73 THYROID CANCER (HCC): Primary | ICD-10-CM

## 2019-10-01 RX ORDER — BISMUTH SUBSALICYLATE 262 MG
1 TABLET,CHEWABLE ORAL DAILY
COMMUNITY

## 2019-10-01 NOTE — PROGRESS NOTES
Lab Results   Component Value Date/Time    TSH 1.000 09/20/2019 10:20 AM    T4, Free 1.55 09/20/2019 10:20 AM

## 2019-10-01 NOTE — PATIENT INSTRUCTIONS
1) TSH is a thyroid test.  Your level is 1.0 which is normal and at goal of 0.5-1.0. This test goes opposite of your thyroid dose and suggests your dose of levothyroxine is perfect so I will keep your dose the same. 2) Your thyroglobulin (thyroid cancer blood test) is undetectable so you still have no evidence of cancer.

## 2019-10-01 NOTE — PROGRESS NOTES
Chief Complaint   Patient presents with    Thyroid Problem     History of Present Illness: Monisha Pabon is a 34 y.o. female here for follow up of thyroid. Weight up 4 lbs since last visit in 9/18. Her son is now 35 years old and is still breast feeding. She switched from the OCP to IUD in 5/19. Has been taking levothyroxine 6.5 tabs/week around 4 am and her mvi is about 3 hours later. 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 Hasn't missed any doses or run out. Overall energy is doing okay. Bowels are regular. No hair loss or brittle nails. Current Outpatient Medications   Medication Sig    levonorgestrel (MIRENA) 20 mcg/24 hours (5 yrs) 52 mg IUD Inserted in May 2019    multivitamin (ONE A DAY) tablet Take 1 Tab by mouth daily.  levothyroxine (SYNTHROID) 112 mcg tablet Take 1 tab 6 days a week and 1/2 tab once a week--Dose change 12/23/18--updated med list--did not send prescription to the pharmacy    PSYLLIUM SEED, WITH SUGAR, (FIBER SUPPLEMENT PO) Take  by mouth two (2) times a day. No current facility-administered medications for this visit. No Known Allergies     Review of Systems:  - Cardiovascular: no chest pain or palpitations  - Neurological: no tremors  - Integumentary: skin is normal    Physical Examination:  Blood pressure (!) 85/51, pulse 79, resp.  rate 16, height 5' 4\" (1.626 m), weight 146 lb 3.2 oz (66.3 kg), SpO2 97 %, currently breastfeeding.  - General: pleasant, no distress, good eye contact   - Neck: well healed inferior neck scar, no palpable thyroid tissue, masses or lymph nodes  - Cardiovascular: regular, normal rate, nl s1 and s2, no m/r/g   - Respiratory: clear to auscultation bilaterally   - Integumentary: skin is normal, no edema  - Neurological: reflexes 2+ at biceps, no tremors  - Psychiatric: normal mood and affect    Data Reviewed:   Component      Latest Ref Rng & Units 9/20/2019 9/20/2019 9/20/2019 9/20/2019          10:20 AM 10:20 AM 10:20 AM 10:20 AM   T4, Free      0.82 - 1.77 ng/dL    1.55   TSH      0.450 - 4.500 uIU/mL   1.000    Thyroglobulin Ab      0.0 - 0.9 IU/mL  <1.0     Thyroglobulin by EVELIO      1.5 - 38.5 ng/mL <0.1 (L)          Assessment/Plan:     1. Thyroid cancer [facility]): Was 16years old and had a physical and was found to have a 3 cm left lobe nodule. Had an ultrasound guided biopsy in June 2007 that showed findings highly suspicious for papillary thyroid cancer. Was referred to a surgeon at [facility] and had total thyroidectomy in July 2007. 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 TSH was 198 and TG 0.5 and with negative TG antibodies prior to CHEN. Received 100.3 mCi CHEN in 8/07 and post-treatment scan did not show any suspicious areas. Was started on levothyroxine 100 mcg daily. Had a Thyrogen stimulated whole body scan in 2008 that was negative along with undetectable TG level and a thyrogen stimulated TG level that was undetectable in 2009 but did not have WBS that year. Her ultrasound was negative in 2010, 2013 and 2015. Her TG levels have been undetectable but she did develop slightly positive TG antibodies after her pregnancy in 2015. Was under the care of [facility] until 2013 and then moved to [facility] and became pregnant in 2004 and delivered her daughter in 2/15.   Her dose remained at 100 mcg daily for many years and then was 100 mcg 1 tab on Mon-Sat and 1.5 tabs on Sun throughout the pregnancy and then after delivery her dose was decreased to 88 mcg daily in 11/15 when her TSH was 0.02 and remained on this until we called her for this visit and told her to take 1 tab on Mon-Fri and 2 tabs on Sat-Sun and has doubled her dose the past 2 weekends. Last TSH was 0.65 and FT4 was 1.14 and TG was < 0.5 with TG ab of 0.5 (nl < 0.4) in 3/16. Had an ultrasound at [facility] in 3/16 that commented on increased size of at least 2 subcentimeter morphologically abnormal lymph nodes, which may be reactive or indicative of recurrent disease. However, because her TG was undetectable, plan was to simply repeat labs and ultrasound in 6 months. Her TSH was 0.46 in 6/16 at her 1st visit with me and I changed her to 112 mcg daily. TSH up to 5.87 in 9/16 and TG < 0.1 and ultrasound showed no suspicious masses or nodes so I increased her to 137 mcg daily and TSH down to 0.89 in 10/16 and 0.88 in 12/16. She decreased to 1 tab 6 days a week on 2/20/17 when her son was born and repeat TSH in 4/17 was 0.055 and FT4 1.85 so decreased her dose to 100 mcg daily. TSH still 0.17 and TG < 0.1 in 8/17 so decreased to 6.5 tabs/week and TSH 0.417 in 9/17 and felt  well so kept her dose the same. TSH up to 2.69 in 9/18 and TG < 0.1 after starting OCP in 6/18 so increased back to 1 tab daily to get to goal TSH of 0.5-1.0 and TSH up to 4.19 in 11/18 so increased to 112 mcg daily and then TSH low at 0.337 in 12/18 so decreased to 6.5 tabs/week and TSH 1.0 and TG < 0.1 in 9/19 so kept dose the same. - cont levothyroxine 112 mcg 1 tab on Mon-Sat and 1/2 tab on Sun  - check TSH and free T4 and thyroglobulin reflex profile prior to next visit        Patient Instructions   1) TSH is a thyroid test.  Your level is 1.0 which is normal and at goal of 0.5-1.0. This test goes opposite of your thyroid dose and suggests your dose of levothyroxine is perfect so I will keep your dose the same. 2) Your thyroglobulin (thyroid cancer blood test) is undetectable so you still have no evidence of cancer. Follow-up and Dispositions    · Return in about 1 year (around 10/1/2020).                Copy sent to:  Oretha Host, MD Dr. Sherryll Prader    Lab follow up: 6/25/20  Component      Latest Ref Rng & Units 6/22/2020 6/22/2020          11:21 AM 11:21 AM   T4, Free      0.82 - 1.77 ng/dL  1.81 (H)   TSH      0.450 - 4.500 uIU/mL 0.385 (L)      Sent her the following message through [employer]:  TSH is a thyroid test.  Your level is 0.38 which is slightly low and below goal of 0.45-2.0 and your free T4 is slightly high at 1.81. This test goes opposite of your thyroid dose and suggests your dose of levothyroxine is slightly more than you need and could possibly be contributing to looser bowels. I recommend decreasing your dose to 1 tab on Mon-Sat and NONE on Sunday until you come back in October. I updated your med list but did not send a new prescription to your pharmacy on file that has been filling this med.

## 2019-10-19 RX ORDER — LEVOTHYROXINE SODIUM 112 UG/1
TABLET ORAL
Qty: 90 TAB | Refills: 3 | Status: SHIPPED | OUTPATIENT
Start: 2019-10-19 | End: 2020-06-25 | Stop reason: DRUGHIGH

## 2020-06-20 DIAGNOSIS — C73 THYROID CANCER (HCC): Primary | ICD-10-CM

## 2020-06-23 LAB
T4 FREE SERPL-MCNC: 1.81 NG/DL (ref 0.82–1.77)
TSH SERPL DL<=0.005 MIU/L-ACNC: 0.39 UIU/ML (ref 0.45–4.5)

## 2020-06-25 RX ORDER — LEVOTHYROXINE SODIUM 112 UG/1
TABLET ORAL
Qty: 90 TAB | Refills: 3
Start: 2020-06-25 | End: 2020-07-14

## 2020-07-14 RX ORDER — LEVOTHYROXINE SODIUM 112 UG/1
TABLET ORAL
Qty: 90 TAB | Refills: 3
Start: 2020-07-14 | End: 2020-09-23

## 2020-09-15 DIAGNOSIS — C73 THYROID CANCER (HCC): ICD-10-CM

## 2020-09-23 RX ORDER — LEVOTHYROXINE SODIUM 112 UG/1
TABLET ORAL
Qty: 90 TAB | Refills: 3 | Status: SHIPPED | OUTPATIENT
Start: 2020-09-23 | End: 2021-10-29

## 2020-10-18 LAB
T4 FREE SERPL-MCNC: 1.43 NG/DL (ref 0.82–1.77)
THYROGLOB AB SERPL-ACNC: <1 IU/ML (ref 0–0.9)
THYROGLOB SERPL-MCNC: <0.1 NG/ML (ref 1.5–38.5)
TSH SERPL DL<=0.005 MIU/L-ACNC: 1.77 UIU/ML (ref 0.45–4.5)

## 2020-10-30 ENCOUNTER — VIRTUAL VISIT (OUTPATIENT)
Dept: ENDOCRINOLOGY | Age: 30
End: 2020-10-30
Payer: COMMERCIAL

## 2020-10-30 DIAGNOSIS — C73 THYROID CANCER (HCC): Primary | ICD-10-CM

## 2020-10-30 PROCEDURE — 99213 OFFICE O/P EST LOW 20 MIN: CPT | Performed by: INTERNAL MEDICINE

## 2020-10-30 RX ORDER — ESCITALOPRAM OXALATE 5 MG/1
TABLET ORAL
COMMUNITY
Start: 2020-08-30

## 2020-10-30 NOTE — PATIENT INSTRUCTIONS
1) TSH is a thyroid test.  Your level is 1.7 which is normal and at goal of 0.45-2.0. This test goes opposite of your thyroid dose and suggests your dose of levothyroxine is perfect so I will keep your dose the same. 2) Your thyroglobulin (thyroid cancer blood test) remains undetectable 13 years out from cancer diagnosis so your risk of recurrence is almost zero at this point. 3) I think Humira is a very reasonable choice to treat your Crohn's and prefer this over oral steroids if possible as steroids can lead to osteoporosis and fluctuating thyroid hormone levels on your lab draws. 4) Please come for a follow up visit on 10/29/21 at 9:10am in our [place] office. 5) I put an order directly into the [employer] system to repeat your labs in the 1-2 weeks prior to your next visit so just ask for the order under my name and you will receive a courtesy reminder through [employer] to have these drawn.

## 2020-10-30 NOTE — PROGRESS NOTES
Chief Complaint   Patient presents with    Thyroid Problem     DOXY [phone]       **THIS IS A VIRTUAL VISIT VIA A VIDEO SYNCHRONOUS DISCUSSION. PATIENT AGREED TO HAVE THEIR CARE DELIVERED OVER A DOXY. ME VIDEO VISIT IN PLACE OF THEIR REGULARLY SCHEDULED OFFICE VISIT**    History of Present Illness: Amy Roldan is a 27 y.o. female here for follow up of thyroid. Was diagnosed with Crohn's disease by Dr. Jacob New in 8/20 and his plan is to try Humira but she wanted to run this past me and I don't see any contraindication from my standpoint. Has been taking 5.5 tabs/week since July and skips Sun and 1/2 tab is on Wed. Weight is 150 up from 146 in 10/19. Still having loose stools despite the decrease in the levothyroxine. No change in hair, skin, or nails. No heat or cold intolerance. Overall energy is the same. Current Outpatient Medications   Medication Sig    escitalopram oxalate (LEXAPRO) 5 mg tablet TAKE 1 TABLET BY MOUTH EVERY DAY    levothyroxine (SYNTHROID) 112 mcg tablet Take 1 tab 5 days a week and 1/2 tab on Wed and NONE once a week    levonorgestrel (MIRENA) 20 mcg/24 hours (5 yrs) 52 mg IUD Inserted in May 2019    multivitamin (ONE A DAY) tablet Take 1 Tab by mouth daily. No current facility-administered medications for this visit.       No Known Allergies     Review of Systems: PER HPI    Physical Examination:  - GENERAL: NCAT, Appears well nourished   - EYES: EOMI, non-icteric, no proptosis   - Ear/Nose/Throat: NCAT, no visible inflammation or masses   - CARDIOVASCULAR: no cyanosis, no visible JVD   - RESPIRATORY: respiratory effort normal without any distress or labored breathing   - MUSCULOSKELETAL: Normal ROM of neck and upper extremities observed   - SKIN: No rash on face  - NEUROLOGIC:  No facial asymmetry (Cranial nerve 7 motor function), No gaze palsy   - PSYCHIATRIC: Normal affect, Normal insight and judgement       Data Reviewed:   Component      Latest Ref Rng & Units 10/16/2020 10/16/2020 10/16/2020 10/16/2020           8:14 AM  8:14 AM  8:14 AM  8:14 AM   T4, Free      0.82 - 1.77 ng/dL    1.43   TSH      0.450 - 4.500 uIU/mL   1.770    Thyroglobulin Ab      0.0 - 0.9 IU/mL  <1.0     Thyroglobulin by EVELIO      1.5 - 38.5 ng/mL <0.1 (L)          Assessment/Plan:     1. Thyroid cancer [facility]): Was 16years old and had a physical and was found to have a 3 cm left lobe nodule. Had an ultrasound guided biopsy in June 2007 that showed findings highly suspicious for papillary thyroid cancer. Was referred to a surgeon at [facility] and had total thyroidectomy in July 2007. 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 TSH was 198 and TG 0.5 and with negative TG antibodies prior to CHEN. Received 100.3 mCi CHEN in 8/07 and post-treatment scan did not show any suspicious areas. Was started on levothyroxine 100 mcg daily. Had a Thyrogen stimulated whole body scan in 2008 that was negative along with undetectable TG level and a thyrogen stimulated TG level that was undetectable in 2009 but did not have WBS that year. Her ultrasound was negative in 2010, 2013 and 2015. Her TG levels have been undetectable but she did develop slightly positive TG antibodies after her pregnancy in 2015. Was under the care of [facility] until 2013 and then moved to [place] and became pregnant in 2004 and delivered her daughter in 2/15. Her dose remained at 100 mcg daily for many years and then was 100 mcg 1 tab on Mon-Sat and 1.5 tabs on Sun throughout the pregnancy and then after delivery her dose was decreased to 88 mcg daily in 11/15 when her TSH was 0.02 and remained on this until we called her for this visit and told her to take 1 tab on Mon-Fri and 2 tabs on Sat-Sun and has doubled her dose the past 2 weekends.   Last TSH was 0.65 and FT4 was 1.14 and TG was < 0.5 with TG ab of 0.5 (nl < 0.4) in 3/16. Had an ultrasound at [facility] in 3/16 that commented on increased size of at least 2 subcentimeter morphologically abnormal lymph nodes, which may be reactive or indicative of recurrent disease. However, because her TG was undetectable, plan was to simply repeat labs and ultrasound in 6 months. Her TSH was 0.46 in 6/16 at her 1st visit with me and I changed her to 112 mcg daily. TSH up to 5.87 in 9/16 and TG < 0.1 and ultrasound showed no suspicious masses or nodes so I increased her to 137 mcg daily and TSH down to 0.89 in 10/16 and 0.88 in 12/16. She decreased to 1 tab 6 days a week on 2/20/17 when her son was born and repeat TSH in 4/17 was 0.055 and FT4 1.85 so decreased her dose to 100 mcg daily. TSH still 0.17 and TG < 0.1 in 8/17 so decreased to 6.5 tabs/week and TSH 0.417 in 9/17 and felt  well so kept her dose the same. TSH up to 2.69 in 9/18 and TG < 0.1 after starting OCP in 6/18 so increased back to 1 tab daily to get to goal TSH of 0.5-1.0 and TSH up to 4.19 in 11/18 so increased to 112 mcg daily and then TSH low at 0.337 in 12/18 so decreased to 6.5 tabs/week and TSH 1.0 and TG < 0.1 in 9/19 so kept dose the same. 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 TSH 0.38 and FT4 1.81 in 6/20 so decreased to 6 tabs/week and TSH 0.21 in 7/20 so decreased to 5.5 tabs/week and TSH 1.77 and TG < 0.1 in 10/20 so kept dose the same. - cont levothyroxine 112 mcg 1 tab 5x/week, 1/2 tab on Wed and none on Sun  - check TSH and free T4 and thyroglobulin reflex profile prior to next visit        Patient Instructions   1) TSH is a thyroid test.  Your level is 1.7 which is normal and at goal of 0.45-2.0. This test goes opposite of your thyroid dose and suggests your dose of levothyroxine is perfect so I will keep your dose the same.     2) Your thyroglobulin (thyroid cancer blood test) remains undetectable 13 years out from cancer diagnosis so your risk of recurrence is almost zero at this point. 3) I think Humira is a very reasonable choice to treat your Crohn's and prefer this over oral steroids if possible as steroids can lead to osteoporosis and fluctuating thyroid hormone levels on your lab draws. 4) Please come for a follow up visit on 10/29/21 at 9:10am in our [place] office. 5) I put an order directly into the [employer] system to repeat your labs in the 1-2 weeks prior to your next visit so just ask for the order under my name and you will receive a courtesy reminder through [employer] to have these drawn. Follow-up and Dispositions    · Return 10/29/21 at 9:10am.               Copy sent to:   Fortunato Moran NP as PCP - General (Nurse Practitioner)  Cleveland Pagan MD (Obstetrics & Gynecology)  Dottie Stewart MD (Gastroenterology)

## 2021-10-15 DIAGNOSIS — C73 THYROID CANCER (HCC): ICD-10-CM

## 2021-10-21 ENCOUNTER — TELEPHONE (OUTPATIENT)
Dept: ENDOCRINOLOGY | Age: 31
End: 2021-10-21

## 2021-10-21 LAB
T4 FREE SERPL-MCNC: 1.23 NG/DL (ref 0.82–1.77)
THYROGLOB AB SERPL-ACNC: <1 IU/ML (ref 0–0.9)
THYROGLOB SERPL-MCNC: <0.1 NG/ML (ref 1.5–38.5)
TSH SERPL DL<=0.005 MIU/L-ACNC: 6.1 UIU/ML (ref 0.45–4.5)

## 2021-10-29 ENCOUNTER — OFFICE VISIT (OUTPATIENT)
Dept: ENDOCRINOLOGY | Age: 31
End: 2021-10-29
Payer: COMMERCIAL

## 2021-10-29 VITALS
HEIGHT: 64 IN | WEIGHT: 172.2 LBS | DIASTOLIC BLOOD PRESSURE: 65 MMHG | SYSTOLIC BLOOD PRESSURE: 103 MMHG | HEART RATE: 82 BPM | BODY MASS INDEX: 29.4 KG/M2

## 2021-10-29 DIAGNOSIS — C73 THYROID CANCER (HCC): Primary | ICD-10-CM

## 2021-10-29 PROCEDURE — 99214 OFFICE O/P EST MOD 30 MIN: CPT | Performed by: INTERNAL MEDICINE

## 2021-10-29 RX ORDER — ADALIMUMAB 40MG/0.4ML
0.4 KIT SUBCUTANEOUS
COMMUNITY

## 2021-10-29 RX ORDER — LEVOTHYROXINE SODIUM 112 UG/1
TABLET ORAL
Qty: 90 TABLET | Refills: 3 | Status: SHIPPED | OUTPATIENT
Start: 2021-10-29 | End: 2022-01-15

## 2021-10-29 NOTE — PROGRESS NOTES
Chief Complaint   Patient presents with    Thyroid Problem     pcp and pharmacy confirmed     History of Present Illness: [place] Mccann is a 32 y.o. female here for follow up of thyroid. Weight up 22 lbs since last visit in 10/20. Was started on Humira by Dr. 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 Still taking levothyroxine 5.5 tabs/week of levothyroxine and has not missed any doses or run out in the past 2 months. Takes the levothyroxine at 4:30am with just water and waits at least 2 hours before eating and coffee and mvi is about 3 hours later. Has been under more stress over the past year and her  is a good baker which has led to more snacking over the past year. Overall energy is still pretty good. No cold intolerance or dry skin or hair loss more than usual.  No constipation. Doesn't get any periods on the Mirena. Current Outpatient Medications   Medication Sig    adalimumab (Humira,CF,) 40 mg/0.4 mL sykt 0.4 mL by SubCUTAneous route every fourteen (14) days.  escitalopram oxalate (LEXAPRO) 5 mg tablet TAKE 1 TABLET BY MOUTH EVERY DAY    levothyroxine (SYNTHROID) 112 mcg tablet Take 1 tab 5 days a week and 1/2 tab on Wed and NONE once a week    levonorgestrel (MIRENA) 20 mcg/24 hours (5 yrs) 52 mg IUD Inserted in May 2019    multivitamin (ONE A DAY) tablet Take 1 Tab by mouth daily. No current facility-administered medications for this visit.      No Known Allergies     Review of Systems: PER HPI    Physical Examination:  Blood pressure 103/65, pulse 82, height 5' 4\" (1.626 m), weight 172 lb 3.2 oz (78.1 kg), currently breastfeeding.  - General: pleasant, no distress, good eye contact   - Neck: no thyromegaly or thyroid bruits  - Cardiovascular: regular, normal rate, nl s1 and s2, no m/r/g   - Respiratory: clear to auscultation bilaterally   - Integumentary: skin is normal, no edema  - Neurological: reflexes 2+ at biceps, no tremors  - Psychiatric: normal mood and affect    Data Reviewed:   Component      Latest Ref Rng & Units 10/20/2021 10/20/2021 10/20/2021 10/20/2021           8:18 AM  8:18 AM  8:18 AM  8:18 AM   T4, Free      0.82 - 1.77 ng/dL    1.23   TSH      0.450 - 4.500 uIU/mL   6.100 (H)    Thyroglobulin Ab      0.0 - 0.9 IU/mL  <1.0     Thyroglobulin by EVELIO      1.5 - 38.5 ng/mL <0.1 (L)        Assessment/Plan:     1. Thyroid cancer [facility]): Was 16years old and had a physical and was found to have a 3 cm left lobe nodule. Had an ultrasound guided biopsy in June 2007 that showed findings highly suspicious for papillary thyroid cancer. Was referred to a surgeon at [facility] and had total thyroidectomy in July 2007. 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 TSH was 198 and TG 0.5 and with negative TG antibodies prior to CHEN. Received 100.3 mCi CHEN in 8/07 and post-treatment scan did not show any suspicious areas. Was started on levothyroxine 100 mcg daily. Had a Thyrogen stimulated whole body scan in 2008 that was negative along with undetectable TG level and a thyrogen stimulated TG level that was undetectable in 2009 but did not have WBS that year. Her ultrasound was negative in 2010, 2013 and 2015. Her TG levels have been undetectable but she did develop slightly positive TG antibodies after her pregnancy in 2015. Was under the care of [facility] until 2013 and then moved to [facility] and became pregnant in 2004 and delivered her daughter in 2/15.   Her dose remained at 100 mcg daily for many years and then was 100 mcg 1 tab on Mon-Sat and 1.5 tabs on Sun throughout the pregnancy and then after delivery her dose was decreased to 88 mcg daily in 11/15 when her TSH was 0.02 and remained on this until we called her for this visit and told her to take 1 tab on Mon-Fri and 2 tabs on Sat-Sun and has doubled her dose the past 2 weekends. Last TSH was 0.65 and FT4 was 1.14 and TG was < 0.5 with TG ab of 0.5 (nl < 0.4) in 3/16. Had an ultrasound at [facility] in 3/16 that commented on increased size of at least 2 subcentimeter morphologically abnormal lymph nodes, which may be reactive or indicative of recurrent disease. However, because her TG was undetectable, plan was to simply repeat labs and ultrasound in 6 months. Her TSH was 0.46 in 6/16 at her 1st visit with me and I changed her to 112 mcg daily. TSH up to 5.87 in 9/16 and TG < 0.1 and ultrasound showed no suspicious masses or nodes so I increased her to 137 mcg daily and TSH down to 0.89 in 10/16 and 0.88 in 12/16. She decreased to 1 tab 6 days a week on 2/20/17 when her son was born and repeat TSH in 4/17 was 0.055 and FT4 1.85 so decreased her dose to 100 mcg daily. TSH still 0.17 and TG < 0.1 in 8/17 so decreased to 6.5 tabs/week and TSH 0.417 in 9/17 and felt  well so kept her dose the same. TSH up to 2.69 in 9/18 and TG < 0.1 after starting OCP in 6/18 so increased back to 1 tab daily to get to goal TSH of 0.5-1.0 and TSH up to 4.19 in 11/18 so increased to 112 mcg daily and then TSH low at 0.337 in 12/18 so decreased to 6.5 tabs/week and TSH 1.0 and TG < 0.1 in 9/19 so kept dose the same. 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 TSH 0.38 and FT4 1.81 in 6/20 so decreased to 6 tabs/week and TSH 0.21 in 7/20 so decreased to 5.5 tabs/week and TSH 1.77 and TG < 0.1 in 10/20 so kept dose the same.   Weight up 22 lbs by 10/21 and TSH up to 6.1 and TG < 0.1 so increased dose back to 1 tab daily  - increase levothyroxine 112 mcg to 1 tab daily  - check TSH and free T4 in 2 months  - check TSH and free T4 and thyroglobulin reflex profile prior to next visit Patient Instructions   1) Your thyroglobulin (thyroid cancer blood test) is undetectable so you have no evidence of thyroid cancer 14 years out from your surgery and this is even more reassuring as your TSH level is elevated this year. 2) TSH is a thyroid test.  Your level is 6.1 which is high and above goal of 0.45-2.0. This test goes opposite of your thyroid dose and suggests your dose of levothyroxine is not enough for your current weight. I will increase your dose to 1 tab 7 days a week and have sent a new prescription to your local pharmacy. 3) I put an order directly into the [employer] system to repeat your labs in 2 months and in the 1-2 weeks prior to your next visit so just ask for the order under my name and you will receive a courtesy reminder through [employer] to have these drawn. Follow-up and Dispositions    · Return in about 1 year (around 10/29/2022). Copy sent to: Destinee Oliva NP as PCP - General (Nurse Practitioner)  Amrita Ambrocio MD (Obstetrics & Gynecology)  Kim Sarmiento MD (Gastroenterology)    Lab follow up: 01/15/22    Component      Latest Ref Rng & Units 1/11/2022 1/11/2022           7:59 AM  7:59 AM   T4, Free      0.82 - 1.77 ng/dL  2.03 (H)   TSH      0.450 - 4.500 uIU/mL 0.139 (L)      Sent her the following message through [employer]:  TSH is a thyroid test.  Your level is 0.139 which is low and below goal of 0.45-2.0 and your free T4 is high at 2.03. This test goes opposite of your thyroid dose and suggests your dose of levothyroxine is now too much. I will change your dose to 100 mcg and take 1 tab 7 days a week.   This will be ready for  at the pharmacy today.  -------------------------------------------------------------------------------------------------------------------  I put an order directly into the [facility] system to repeat your labs in 2 months so just ask for the order under my name and you will receive a courtesy reminder through [email or web address] to have these drawn and I'll be in touch with the results. Lab follow up: 03/19/22    Component      Latest Ref Rng & Units 3/17/2022 3/17/2022           8:28 AM  8:28 AM   T4, Free      0.82 - 1.77 ng/dL  1.57   TSH      0.450 - 4.500 uIU/mL 0.350 (L)      Sent her the following message through [employer]:  TSH is a thyroid test.  Your level is 0.35 which is just barely low and below goal of 0.45-2.0. This test goes opposite of your thyroid dose and suggests your dose of levothyroxine is just barely more than you need. I will decrease your dose to 1 tab on Mon-Sat and 1/2 tab on Sun. I updated your med list but did not send a new prescription to your pharmacy on file that has been filling this med.  -------------------------------------------------------------------------------------------------------------------  As long as you're feeling well, I'm fine with repeating your labs again prior to your visit in October but if you feel you need your labs checked sooner, just let me know.

## 2021-10-29 NOTE — PATIENT INSTRUCTIONS
1) Your thyroglobulin (thyroid cancer blood test) is undetectable so you have no evidence of thyroid cancer 14 years out from your surgery and this is even more reassuring as your TSH level is elevated this year. 2) TSH is a thyroid test.  Your level is 6.1 which is high and above goal of 0.45-2.0. This test goes opposite of your thyroid dose and suggests your dose of levothyroxine is not enough for your current weight. I will increase your dose to 1 tab 7 days a week and have sent a new prescription to your local pharmacy. 3) I put an order directly into the [employer] system to repeat your labs in 2 months and in the 1-2 weeks prior to your next visit so just ask for the order under my name and you will receive a courtesy reminder through [employer] to have these drawn.

## 2021-12-29 DIAGNOSIS — C73 THYROID CANCER (HCC): ICD-10-CM

## 2022-01-12 LAB
T4 FREE SERPL-MCNC: 2.03 NG/DL (ref 0.82–1.77)
TSH SERPL DL<=0.005 MIU/L-ACNC: 0.14 UIU/ML (ref 0.45–4.5)

## 2022-01-15 RX ORDER — LEVOTHYROXINE SODIUM 100 UG/1
TABLET ORAL
Qty: 90 TABLET | Refills: 3 | Status: SHIPPED | OUTPATIENT
Start: 2022-01-15 | End: 2022-10-28 | Stop reason: ALTCHOICE

## 2022-03-15 DIAGNOSIS — C73 THYROID CANCER (HCC): ICD-10-CM

## 2022-03-18 LAB
T4 FREE SERPL-MCNC: 1.57 NG/DL (ref 0.82–1.77)
TSH SERPL DL<=0.005 MIU/L-ACNC: 0.35 UIU/ML (ref 0.45–4.5)

## 2022-10-15 DIAGNOSIS — C73 THYROID CANCER (HCC): ICD-10-CM

## 2022-10-21 LAB
T4 FREE SERPL-MCNC: 1.3 NG/DL (ref 0.82–1.77)
THYROGLOB AB SERPL-ACNC: <1 IU/ML (ref 0–0.9)
THYROGLOB SERPL-MCNC: <0.1 NG/ML (ref 1.5–38.5)
TSH SERPL DL<=0.005 MIU/L-ACNC: 0.36 UIU/ML (ref 0.45–4.5)

## 2022-10-28 ENCOUNTER — OFFICE VISIT (OUTPATIENT)
Dept: ENDOCRINOLOGY | Age: 32
End: 2022-10-28
Payer: COMMERCIAL

## 2022-10-28 VITALS
WEIGHT: 174.2 LBS | DIASTOLIC BLOOD PRESSURE: 62 MMHG | HEART RATE: 85 BPM | SYSTOLIC BLOOD PRESSURE: 103 MMHG | HEIGHT: 64 IN | BODY MASS INDEX: 29.74 KG/M2

## 2022-10-28 DIAGNOSIS — C73 THYROID CANCER (HCC): Primary | ICD-10-CM

## 2022-10-28 PROCEDURE — 99214 OFFICE O/P EST MOD 30 MIN: CPT | Performed by: INTERNAL MEDICINE

## 2022-10-28 RX ORDER — LEVOTHYROXINE SODIUM 100 UG/1
TABLET ORAL
Qty: 90 TABLET | Refills: 0 | Status: SHIPPED | OUTPATIENT
Start: 2022-10-28

## 2022-10-28 RX ORDER — LEVOTHYROXINE SODIUM 100 UG/1
TABLET ORAL
Qty: 14 TABLET | Refills: 0 | Status: SHIPPED | COMMUNITY
Start: 2022-10-28 | End: 2022-10-28 | Stop reason: SDUPTHER

## 2022-10-28 NOTE — PATIENT INSTRUCTIONS
1) TSH is a thyroid test.  Your level is 0.36 which is slightly low and belw goal of 0.45-2.0. This test goes opposite of your thyroid dose and suggests your dose of levothyroxine is likely adequate but because of the variability with generic, sometimes your level will fluctuate and you will feel better with brand medication. 2) I will change to brand unithroid at the same dose of 100 mcg 6.5 tabs/week and plan on repeating your labs in 2 months provided you are able to fill the brand. If you have to wait to fill this, then once you start the brand, plan on repeating labs about 6 weeks after being on the brand. 3) Your thyroglobulin (thyroid cancer blood test) is undetectable so you have no evidence of thyroid cancer 15 years out from your surgery.

## 2022-10-28 NOTE — PROGRESS NOTES
Chief Complaint   Patient presents with    Thyroid Problem     Pcp and pharmacy confirmed     History of Present Illness: Brandno Davis is a 28 y.o. female here for follow up of thyroid. No major change to health since last visit aside from some Crohn's flares and was put on prednisone but was not hospitalized. Last burst was in 8/22. Weight up 2 lbs since last visit in 10/21. Taking levothyroxine 1 tab on Mon-Sat and 1/2 tab on Sun. Takes every morning at least 2 hours before any food and her mvi is several hours later. Hasn't missed any doses. Bowels have been looser this summer with her Crohn's flare. Still followed by Dr. Idalmsi Lopez. No heat or cold intolerance. No hair loss or brittle nails or dry skin. No chest pain or palpitations or tremors. No trouble sleeping aside from being on the prednisone. No neck lumps. Current Outpatient Medications   Medication Sig    levothyroxine (SYNTHROID) 100 mcg tablet Take 1 tab 7 days a week--Delete 112 mcg dose from profile (Patient taking differently: Take 1 tab 7 days a week--Delete 112 mcg dose from profile      One tab 6 days a week and 1/2 tab on the 7th day)    adalimumab (Humira,CF,) 40 mg/0.4 mL sykt 0.4 mL by SubCUTAneous route every fourteen (14) days. escitalopram oxalate (LEXAPRO) 5 mg tablet TAKE 1 TABLET BY MOUTH EVERY DAY    levonorgestreL (MIRENA) 20 mcg/24 hours (8 yrs) 52 mg IUD Inserted in May 2019    multivitamin (ONE A DAY) tablet Take 1 Tab by mouth daily. No current facility-administered medications for this visit. No Known Allergies    Review of Systems: PER HPI    Physical Examination:  Blood pressure 103/62, pulse 85, height 5' 4\" (1.626 m), weight 174 lb 3.2 oz (79 kg), currently breastfeeding.   General: pleasant, no distress, good eye contact   Neck: well healed inferior neck scar, no palpable thyroid tissue, masses or lymph nodes  Cardiovascular: regular, normal rate, nl s1 and s2, no m/r/g   Respiratory: clear to auscultation bilaterally   Integumentary: skin is normal, no edema  Neurological: reflexes 2+ at biceps, very mild tremor  Psychiatric: normal mood and affect    Data Reviewed:   Component      Latest Ref Rng & Units 10/20/2022 10/20/2022 10/20/2022 10/20/2022           8:06 AM  8:06 AM  8:06 AM  8:06 AM   TSH      0.450 - 4.500 uIU/mL    0.364 (L)   T4, Free      0.82 - 1.77 ng/dL   1.30    Thyroglobulin Ab      0.0 - 0.9 IU/mL  <1.0     Thyroglobulin by EVELIO      1.5 - 38.5 ng/mL <0.1 (L)          Assessment/Plan:     1. Thyroid cancer [facility]): Was 16years old and had a physical and was found to have a 3 cm left lobe nodule. Had an ultrasound guided biopsy in June 2007 that showed findings highly suspicious for papillary thyroid cancer. Was referred to a surgeon at [facility] and had total thyroidectomy in July 2007. 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 TSH was 198 and TG 0.5 and with negative TG antibodies prior to CHEN. Received 100.3 mCi CHEN in 8/07 and post-treatment scan did not show any suspicious areas. Was started on levothyroxine 100 mcg daily. Had a Thyrogen stimulated whole body scan in 2008 that was negative along with undetectable TG level and a thyrogen stimulated TG level that was undetectable in 2009 but did not have WBS that year. Her ultrasound was negative in 2010, 2013 and 2015. Her TG levels have been undetectable but she did develop slightly positive TG antibodies after her pregnancy in 2015. Was under the care of [facility] until 2013 and then moved to [facility] and became pregnant in 2004 and delivered her daughter in 2/15.   Her dose remained at 100 mcg daily for many years and then was 100 mcg 1 tab on Mon-Sat and 1.5 tabs on Sun throughout the pregnancy and then after delivery her dose was decreased to 88 mcg daily in 11/15 when her TSH was 0.02 and remained on this until we called her for this visit and told her to take 1 tab on Mon-Fri and 2 tabs on Sat-Sun and has doubled her dose the past 2 weekends. Last TSH was 0.65 and FT4 was 1.14 and TG was < 0.5 with TG ab of 0.5 (nl < 0.4) in 3/16. Had an ultrasound at [facility] in 3/16 that commented on increased size of at least 2 subcentimeter morphologically abnormal lymph nodes, which may be reactive or indicative of recurrent disease. However, because her TG was undetectable, plan was to simply repeat labs and ultrasound in 6 months. Her TSH was 0.46 in 6/16 at her 1st visit with me and I changed her to 112 mcg daily. TSH up to 5.87 in 9/16 and TG < 0.1 and ultrasound showed no suspicious masses or nodes so I increased her to 137 mcg daily and TSH down to 0.89 in 10/16 and 0.88 in 12/16. She decreased to 1 tab 6 days a week on 2/20/17 when her son was born and repeat TSH in 4/17 was 0.055 and FT4 1.85 so decreased her dose to 100 mcg daily. TSH still 0.17 and TG < 0.1 in 8/17 so decreased to 6.5 tabs/week and TSH 0.417 in 9/17 and felt  well so kept her dose the same. TSH up to 2.69 in 9/18 and TG < 0.1 after starting OCP in 6/18 so increased back to 1 tab daily to get to goal TSH of 0.5-1.0 and TSH up to 4.19 in 11/18 so increased to 112 mcg daily and then TSH low at 0.337 in 12/18 so decreased to 6.5 tabs/week and TSH 1.0 and TG < 0.1 in 9/19 so kept dose the same. 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 TSH 0.38 and FT4 1.81 in 6/20 so decreased to 6 tabs/week and TSH 0.21 in 7/20 so decreased to 5.5 tabs/week and TSH 1.77 and TG < 0.1 in 10/20 so kept dose the same.   Weight up 22 lbs by 10/21 and TSH up to 6.1 and TG < 0.1 so increased dose back to 1 tab daily and TSH 0.139 and FT4 2.02 in 1/22 so changed to 100 mcg daily and TSH 0.35 in 3/22 so decreased to 6.5 tabs/week and TSH 0.36 and TG < 0.1 in 10/22 so changed to brand unithroid 100 mcg 6.5 tabs/week in cased her levels are fluctuating due to generic.  - change to unithroid 100 mcg 6.5 tabs/week  - check TSH and free T4 6 weeks after being on brand  - check TSH and free T4 and thyroglobulin reflex profile prior to next visit        Patient Instructions   1) TSH is a thyroid test.  Your level is 0.36 which is slightly low and belw goal of 0.45-2.0. This test goes opposite of your thyroid dose and suggests your dose of levothyroxine is likely adequate but because of the variability with generic, sometimes your level will fluctuate and you will feel better with brand medication. 2) I will change to brand unithroid at the same dose of 100 mcg 6.5 tabs/week and plan on repeating your labs in 2 months provided you are able to fill the brand. If you have to wait to fill this, then once you start the brand, plan on repeating labs about 6 weeks after being on the brand. 3) Your thyroglobulin (thyroid cancer blood test) is undetectable so you have no evidence of thyroid cancer 15 years out from your surgery. Follow-up and Dispositions    Return in about 1 year (around 10/28/2023). Copy sent to:   Merlin Jacks, NP as PCP - General (Nurse Practitioner)  Mckenna Barroso MD (Obstetrics & Gynecology)  Tanvir Escudero MD (Gastroenterology)

## 2022-12-09 DIAGNOSIS — C73 THYROID CANCER (HCC): ICD-10-CM

## 2023-03-25 RX ORDER — LEVOTHYROXINE SODIUM 100 UG/1
TABLET ORAL
Qty: 90 TABLET | Refills: 3 | Status: SHIPPED | OUTPATIENT
Start: 2023-03-25

## 2023-04-22 DIAGNOSIS — C73 THYROID CANCER (HCC): Primary | ICD-10-CM

## 2023-04-23 DIAGNOSIS — C73 THYROID CANCER (HCC): Primary | ICD-10-CM

## 2023-04-24 DIAGNOSIS — C73 THYROID CANCER (HCC): Primary | ICD-10-CM

## 2023-05-21 RX ORDER — LEVOTHYROXINE SODIUM 0.1 MG/1
TABLET ORAL
COMMUNITY
Start: 2023-03-25

## 2023-05-21 RX ORDER — ADALIMUMAB 40MG/0.4ML
0.4 KIT SUBCUTANEOUS
COMMUNITY

## 2023-05-21 RX ORDER — ESCITALOPRAM OXALATE 5 MG/1
1 TABLET ORAL DAILY
COMMUNITY
Start: 2020-08-30

## 2023-10-10 DIAGNOSIS — C73 THYROID CANCER (HCC): ICD-10-CM

## 2023-10-17 LAB
T4 FREE SERPL-MCNC: 1.63 NG/DL (ref 0.82–1.77)
TSH SERPL DL<=0.005 MIU/L-ACNC: 0.12 UIU/ML (ref 0.45–4.5)

## 2023-10-18 LAB
THYROGLOB AB SERPL-ACNC: <1 IU/ML (ref 0–0.9)
THYROGLOB SERPL-MCNC: <0.1 NG/ML (ref 1.5–38.5)

## 2023-10-27 ENCOUNTER — OFFICE VISIT (OUTPATIENT)
Age: 33
End: 2023-10-27
Payer: COMMERCIAL

## 2023-10-27 VITALS
SYSTOLIC BLOOD PRESSURE: 113 MMHG | WEIGHT: 181.6 LBS | DIASTOLIC BLOOD PRESSURE: 76 MMHG | BODY MASS INDEX: 31 KG/M2 | HEART RATE: 99 BPM | HEIGHT: 64 IN

## 2023-10-27 DIAGNOSIS — C73 MALIGNANT NEOPLASM OF THYROID GLAND (HCC): Primary | ICD-10-CM

## 2023-10-27 PROCEDURE — 99214 OFFICE O/P EST MOD 30 MIN: CPT | Performed by: INTERNAL MEDICINE

## 2023-10-27 RX ORDER — LEVOTHYROXINE SODIUM 88 UG/1
88 TABLET ORAL DAILY
Qty: 90 TABLET | Refills: 3 | Status: SHIPPED | OUTPATIENT
Start: 2023-10-27

## 2023-10-27 NOTE — PROGRESS NOTES
Chief Complaint   Patient presents with    Thyroid Problem     PCP and pharmacy confirmed      History of Present Illness: Venkatesh Garcia is a 35 y.o. female here for follow up of thyroid. Weight up 7 lbs since last visit in 10/22. Her maternal grandmother passed away after last visit out in [facility] and she was driving back and forth to care for him and missed her lab draw due for 6 weeks after changing to brand. Her maternal grandfather is now on hospice with a-fib. Had a colonoscopy in 4/23 and was diagnosed with collagenous colitis in addition to the Crohns and was given prednisone to take as needed for flares and her last course was in 6/23. Still on humira every 14 days. Has been taking the brand unithroid 6.5 tabs/week. Has not been getting a whole pill on Sundays on accident. Takes the pill around 5am and wakes up at 7am.  Overall energy is about the same. No chest pain or palpitations or tremors. No hair loss or  brittle nails. No anxiety or trouble sleeping. No heat intolerance or easy sweating. 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 Current Outpatient Medications   Medication Sig    Adalimumab (HUMIRA) 40 MG/0.4ML PSKT Inject 0.4 mLs into the skin every 14 days    escitalopram (LEXAPRO) 5 MG tablet Take 1 tablet by mouth daily    levonorgestrel (MIRENA) IUD 52 mg Inserted in May 2019    levothyroxine (UNITHROID) 100 MCG tablet TAKE 1 TAB ON MON-SAT AND 1/2 TAB ON SUN--BRAND MEDICALLY NECESSARY--REPLACES GENERIC LEVOTHYROXINE     No current facility-administered medications for this visit. No Known Allergies    Review of Systems: PER HPI    Physical Examination:  Blood pressure 113/76, pulse 99, height 1.626 m (5' 4\"), weight 82.4 kg (181 lb 9.6 oz).   General: pleasant, no distress, good eye contact   Neck: well healed inferior neck scar, no palpable thyroid tissue, masses or lymph nodes  Cardiovascular: regular, normal rate, nl

## 2023-10-27 NOTE — PATIENT INSTRUCTIONS
1) TSH is a thyroid test.  Your level is 0.118 which is low and below goal of 0.45-2.0. The TSH goes opposite of your thyroid dose and suggests your dose of unithroid is more than you need. I will decrease your dose to 88 mcg 1 tab 7 days a week and have sent a new prescription to your local pharmacy. Feel free to use up the 100 mcg tabs by taking 1 tab on Monday-Saturday and NONE on Sunday. Skip your dose on Sat and Sun and then start the new regimen on Monday. 2) Your thyroglobulin (thyroid cancer blood test) is undetectable so you have no evidence of thyroid cancer 16 years out from your surgery. 3) Repeat your labs in 2 months and again prior to the visit in 1 year. I will send you a message through [employer] with your lab results.

## 2023-12-27 DIAGNOSIS — C73 MALIGNANT NEOPLASM OF THYROID GLAND (HCC): ICD-10-CM

## 2024-01-06 LAB
T4 FREE SERPL-MCNC: 1.66 NG/DL (ref 0.82–1.77)
TSH SERPL DL<=0.005 MIU/L-ACNC: 0.47 UIU/ML (ref 0.45–4.5)

## 2024-09-08 RX ORDER — LEVOTHYROXINE SODIUM 88 UG/1
TABLET ORAL
Qty: 90 TABLET | Refills: 3 | Status: SHIPPED | OUTPATIENT
Start: 2024-09-08

## 2024-10-14 DIAGNOSIS — C73 MALIGNANT NEOPLASM OF THYROID GLAND (HCC): ICD-10-CM

## 2024-10-23 LAB
T4 FREE SERPL-MCNC: 1.44 NG/DL (ref 0.82–1.77)
TSH SERPL DL<=0.005 MIU/L-ACNC: 0.99 UIU/ML (ref 0.45–4.5)

## 2024-10-29 LAB
THYROGLOB AB SERPL-ACNC: <1 IU/ML
THYROGLOB SERPL-MCNC: <0.1 NG/ML
THYROGLOB SERPL-MCNC: NORMAL NG/ML

## 2024-11-01 ENCOUNTER — OFFICE VISIT (OUTPATIENT)
Age: 34
End: 2024-11-01
Payer: COMMERCIAL

## 2024-11-01 VITALS
HEART RATE: 87 BPM | OXYGEN SATURATION: 97 % | BODY MASS INDEX: 32.27 KG/M2 | DIASTOLIC BLOOD PRESSURE: 62 MMHG | SYSTOLIC BLOOD PRESSURE: 103 MMHG | WEIGHT: 189 LBS | HEIGHT: 64 IN

## 2024-11-01 DIAGNOSIS — C73 MALIGNANT NEOPLASM OF THYROID GLAND (HCC): Primary | ICD-10-CM

## 2024-11-01 PROCEDURE — 99214 OFFICE O/P EST MOD 30 MIN: CPT | Performed by: INTERNAL MEDICINE

## 2024-11-01 NOTE — PROGRESS NOTES
Chief Complaint   Patient presents with    Thyroid Problem     Malignant neoplasm of thyroid gland (HCC)     History of Present Illness: Maria Esther Miranda is a 34 y.o. female here for follow up of thyroid.  Weight up 8 lbs since last visit in 10/23.  Did need one steroid taper in 6/24 but no other changes to her health over the past year.  Getting the unithroid every morning around 5 am at least 2 hours before food and coffee and hasn't missed any doses.  Still on generic humira for the colitis from Crohn's and collagenous colitis and will need another colonoscopy in the spring of 2025 and bowels can be still loose at times.  No trouble with skin, hair, or nails.  No change in temperature.  Energy is good. No neck lumps.  Her maternal grandfather passed away in 6/24.  Her daughter is turning 10 this month and son will be 8 in 2/24.  Still home schooling both of them.      Current Outpatient Medications   Medication Sig    UNITHROID 88 MCG tablet TAKE 1 TABLET BY MOUTH DAILY BRAND MEDICALLY NECESSARY    Adalimumab (HUMIRA) 40 MG/0.4ML PSKT Inject 0.4 mLs into the skin every 14 days    levonorgestrel (MIRENA) IUD 52 mg Inserted in May 2019     No current facility-administered medications for this visit.     No Known Allergies    Review of Systems: PER HPI    Physical Examination:  Blood pressure 103/62, pulse 87, height 1.626 m (5' 4\"), weight 85.7 kg (189 lb), SpO2 97%.  General: pleasant, no distress, good eye contact   Neck: well healed inferior neck scar, no palpable thyroid tissue, masses or lymph nodes  Cardiovascular: regular, normal rate, nl s1 and s2, no m/r/g   Respiratory: clear to auscultation bilaterally   Integumentary: skin is normal, no edema  Neurological: reflexes 2+ at biceps, no tremors  Psychiatric: normal mood and affect    Data Reviewed:   Component      Latest Ref Rng 10/22/2024   Thyroglobulin Ab      IU/mL <1.0    Thyroglobulin      ng/mL Comment    Thyroglobulin      ng/mL <0.1    TSH, 3rd

## 2024-11-01 NOTE — PROGRESS NOTES
Patient identified by name and date of birth  Maria Esther Miranda is a 34 y.o. female   Chief Complaint   Patient presents with    Thyroid Problem     Malignant neoplasm of thyroid gland (HCC)      Vitals:    11/01/24 0822   BP: 103/62   Site: Left Upper Arm   Pulse: 87   SpO2: 97%   Weight: 85.7 kg (189 lb)   Height: 1.626 m (5' 4\")       No LMP recorded.           \"Have you been to the ER, urgent care clinic since your last visit?  Hospitalized since your last visit?\"    NO    “Have you seen or consulted any other health care providers outside of [facility] since your last visit?”    NO

## 2024-11-01 NOTE — PATIENT INSTRUCTIONS
1) Your thyroglobulin (thyroid cancer blood test) is undetectable so you have no evidence of thyroid cancer 17 years out from your surgery.    2) TSH is a thyroid test.  Your level is 0.9 which is normal and at goal of 0.45-1.0.  The TSH goes opposite of your thyroid dose and suggests your dose of unithroid is perfect so I will keep your dose the same.    3) I will plan on seeing you back in one year but if you feel you need to have your labs checked sooner, please let me know.

## 2025-09-03 RX ORDER — LEVOTHYROXINE SODIUM 88 UG/1
TABLET ORAL
Qty: 90 TABLET | Refills: 3 | Status: SHIPPED | OUTPATIENT
Start: 2025-09-03